# Patient Record
Sex: FEMALE | Race: WHITE | NOT HISPANIC OR LATINO | Employment: UNEMPLOYED | URBAN - NONMETROPOLITAN AREA
[De-identification: names, ages, dates, MRNs, and addresses within clinical notes are randomized per-mention and may not be internally consistent; named-entity substitution may affect disease eponyms.]

---

## 2019-03-15 ENCOUNTER — TRANSCRIBE ORDERS (OUTPATIENT)
Dept: MRI IMAGING | Facility: HOSPITAL | Age: 19
End: 2019-03-15

## 2019-03-15 DIAGNOSIS — M79.671 RIGHT FOOT PAIN: Primary | ICD-10-CM

## 2019-03-21 ENCOUNTER — HOSPITAL ENCOUNTER (OUTPATIENT)
Dept: MRI IMAGING | Facility: HOSPITAL | Age: 19
Discharge: HOME | End: 2019-03-21
Admitting: NURSE PRACTITIONER

## 2019-03-21 DIAGNOSIS — M79.671 RIGHT FOOT PAIN: ICD-10-CM

## 2019-03-21 PROCEDURE — 73718 MRI LOWER EXTREMITY W/O DYE: CPT

## 2020-10-15 ENCOUNTER — OFFICE VISIT (OUTPATIENT)
Dept: GASTROENTEROLOGY | Facility: CLINIC | Age: 20
End: 2020-10-15

## 2020-10-15 VITALS
SYSTOLIC BLOOD PRESSURE: 127 MMHG | HEART RATE: 74 BPM | BODY MASS INDEX: 29.57 KG/M2 | HEIGHT: 64 IN | WEIGHT: 173.2 LBS | DIASTOLIC BLOOD PRESSURE: 88 MMHG

## 2020-10-15 DIAGNOSIS — R19.4 CHANGE IN BOWEL HABITS: Primary | ICD-10-CM

## 2020-10-15 DIAGNOSIS — R10.11 RIGHT UPPER QUADRANT ABDOMINAL PAIN: ICD-10-CM

## 2020-10-15 PROCEDURE — 99204 OFFICE O/P NEW MOD 45 MIN: CPT | Performed by: INTERNAL MEDICINE

## 2020-10-15 RX ORDER — SODIUM, POTASSIUM,MAG SULFATES 17.5-3.13G
1 SOLUTION, RECONSTITUTED, ORAL ORAL EVERY 12 HOURS
Qty: 1 BOTTLE | Refills: 0 | Status: SHIPPED | OUTPATIENT
Start: 2020-10-15 | End: 2020-10-22 | Stop reason: HOSPADM

## 2020-10-15 RX ORDER — DEXTROSE AND SODIUM CHLORIDE 5; .45 G/100ML; G/100ML
30 INJECTION, SOLUTION INTRAVENOUS CONTINUOUS PRN
Status: CANCELLED | OUTPATIENT
Start: 2020-10-22

## 2020-10-15 NOTE — H&P (VIEW-ONLY)
[facility], [facility]      Chief Complaint:   Chief Complaint   Patient presents with   • Diarrhea       Subjective     HPI:   Patient with recent development of severe diarrhea.  Patient states is becoming increasingly life-changing.  Patient has a family history of irritable bowel syndrome and her mother but no other family history of GI problems.  Patient also with right upper quadrant abdominal pain which she describes has occurred for the past 3 months.  Patient states is only infrequently happens but radiates up into her shoulders.  Patient is not taking any medications that would cause diarrhea.  Patient denies any blood in stool or mucus in stool.    Plan; we will schedule patient for EGD and colonoscopy to evaluate also do ultrasound the abdomen we will do lab work including CMP CBC amylase and lipase will have patient follow-up following these procedures    Past Medical History:   History reviewed. No pertinent past medical history.    Past Surgical History:  Past Surgical History:   Procedure Laterality Date   • WISDOM TOOTH EXTRACTION  2018       Family History:  Family History   Problem Relation Age of Onset   • Irritable bowel syndrome Mother        Social History:       Medications:   Prior to Admission medications    Medication Sig Start Date End Date Taking? Authorizing Provider   Ascorbic Acid (VITAMIN C PO) Take  by mouth.   Yes ProviderJeri MD   Ferrous Sulfate (IRON PO) Take  by mouth.   Yes ProviderJeri MD   Levonorgestrel-Ethinyl Estrad (LARISSIA PO) Take  by mouth.   Yes ProviderJeri MD   sodium-potassium-magnesium sulfates (SUPREP) 17.5-3.13-1.6 GM/177ML solution oral solution Take 1 bottle by mouth Every 12 (Twelve) Hours. 10/15/20   Devon Santoro MD       Allergies:  Patient has no known allergies.    ROS:    Review of Systems   Constitutional: Negative for activity change, appetite change, chills, diaphoresis, fatigue, fever and unexpected  "weight change.   HENT: Negative for sore throat and trouble swallowing.    Respiratory: Negative for shortness of breath.    Gastrointestinal: Positive for abdominal pain and diarrhea. Negative for abdominal distention, anal bleeding, blood in stool, constipation, nausea, rectal pain and vomiting.   Endocrine: Negative for polydipsia, polyphagia and polyuria.   Genitourinary: Negative for difficulty urinating.   Musculoskeletal: Negative for arthralgias.   Skin: Negative for pallor.   Allergic/Immunologic: Negative for food allergies.   Neurological: Negative for weakness and light-headedness.   Psychiatric/Behavioral: Negative for behavioral problems.     Objective     Blood pressure 127/88, pulse 74, height 162.6 cm (64\"), weight 78.6 kg (173 lb 3.2 oz), not currently breastfeeding.    Physical Exam  Constitutional:       General: She is not in acute distress.     Appearance: She is well-developed. She is not diaphoretic.   HENT:      Head: Normocephalic and atraumatic.   Cardiovascular:      Rate and Rhythm: Normal rate and regular rhythm.      Heart sounds: Normal heart sounds. No murmur. No friction rub. No gallop.    Pulmonary:      Effort: No respiratory distress.      Breath sounds: Normal breath sounds. No wheezing or rales.   Chest:      Chest wall: No tenderness.   Abdominal:      General: Bowel sounds are normal. There is no distension.      Palpations: Abdomen is soft. There is no mass.      Tenderness: There is no abdominal tenderness. There is no guarding or rebound.      Hernia: No hernia is present.   Musculoskeletal: Normal range of motion.   Skin:     General: Skin is warm and dry.      Coloration: Skin is not pale.      Findings: No erythema or rash.   Neurological:      Mental Status: She is alert and oriented to person, place, and time.   Psychiatric:         Behavior: Behavior normal.         Thought Content: Thought content normal.         Judgment: Judgment normal.          Assessment/Plan "   Diagnoses and all orders for this visit:    1. Change in bowel habits (Primary)  -     Case Request; Standing  -     dextrose 5 % and sodium chloride 0.45 % infusion  -     Case Request  -     Comprehensive Metabolic Panel; Future  -     CBC & Differential; Future  -     Amylase; Future  -     Lipase; Future    2. Right upper quadrant abdominal pain  -     Case Request; Standing  -     dextrose 5 % and sodium chloride 0.45 % infusion  -     Case Request  -     Comprehensive Metabolic Panel; Future  -     CBC & Differential; Future  -     Amylase; Future  -     Lipase; Future    Other orders  -     Follow Anesthesia Guidelines / Standing Orders; Future  -     Obtain Informed Consent; Future  -     Implement Anesthesia Orders Day of Procedure; Standing  -     Obtain Informed Consent; Standing  -     POC Glucose Once; Standing  -     Pregnancy, Urine -; Standing  -     Insert Peripheral IV; Standing  -     sodium-potassium-magnesium sulfates (SUPREP) 17.5-3.13-1.6 GM/177ML solution oral solution; Take 1 bottle by mouth Every 12 (Twelve) Hours.  Dispense: 1 bottle; Refill: 0        ESOPHAGOGASTRODUODENOSCOPY (N/A), COLONOSCOPY (N/A)     Diagnosis Plan   1. Change in bowel habits  Case Request    dextrose 5 % and sodium chloride 0.45 % infusion    Case Request    Comprehensive Metabolic Panel    CBC & Differential    Amylase    Lipase   2. Right upper quadrant abdominal pain  Case Request    dextrose 5 % and sodium chloride 0.45 % infusion    Case Request    Comprehensive Metabolic Panel    CBC & Differential    Amylase    Lipase       Anticipated Surgical Procedure:  Orders Placed This Encounter   Procedures   • Comprehensive Metabolic Panel     Standing Status:   Future   • Amylase     Standing Status:   Future   • Lipase     Standing Status:   Future   • Follow Anesthesia Guidelines / Standing Orders     Standing Status:   Future   • Obtain Informed Consent     Standing Status:   Future     Order Specific Question:    Informed Consent Given For     Answer:   egd and colonoscopy   • CBC & Differential     Standing Status:   Future     Order Specific Question:   Manual Differential     Answer:   No       The risks, benefits, and alternatives of this procedure have been discussed with the patient or the responsible party- the patient understands and agrees to proceed.

## 2020-10-19 ENCOUNTER — LAB (OUTPATIENT)
Dept: LAB | Facility: HOSPITAL | Age: 20
End: 2020-10-19

## 2020-10-19 DIAGNOSIS — R19.4 CHANGE IN BOWEL HABITS: ICD-10-CM

## 2020-10-19 DIAGNOSIS — Z01.818 PREOP TESTING: Primary | ICD-10-CM

## 2020-10-19 DIAGNOSIS — R10.11 RIGHT UPPER QUADRANT ABDOMINAL PAIN: ICD-10-CM

## 2020-10-19 LAB
ALBUMIN SERPL-MCNC: 4.8 G/DL (ref 3.5–5.2)
ALBUMIN/GLOB SERPL: 1.7 G/DL
ALP SERPL-CCNC: 71 U/L (ref 39–117)
ALT SERPL W P-5'-P-CCNC: 21 U/L (ref 1–33)
AMYLASE SERPL-CCNC: 66 U/L (ref 28–100)
ANION GAP SERPL CALCULATED.3IONS-SCNC: 11 MMOL/L (ref 5–15)
AST SERPL-CCNC: 20 U/L (ref 1–32)
BASOPHILS # BLD AUTO: 0.02 10*3/MM3 (ref 0–0.2)
BASOPHILS NFR BLD AUTO: 0.4 % (ref 0–1.5)
BILIRUB SERPL-MCNC: 0.2 MG/DL (ref 0–1.2)
BUN SERPL-MCNC: 17 MG/DL (ref 6–20)
BUN/CREAT SERPL: 21 (ref 7–25)
CALCIUM SPEC-SCNC: 9.9 MG/DL (ref 8.6–10.5)
CHLORIDE SERPL-SCNC: 104 MMOL/L (ref 98–107)
CO2 SERPL-SCNC: 25 MMOL/L (ref 22–29)
CREAT SERPL-MCNC: 0.81 MG/DL (ref 0.57–1)
DEPRECATED RDW RBC AUTO: 42.8 FL (ref 37–54)
EOSINOPHIL # BLD AUTO: 0.07 10*3/MM3 (ref 0–0.4)
EOSINOPHIL NFR BLD AUTO: 1.4 % (ref 0.3–6.2)
ERYTHROCYTE [DISTWIDTH] IN BLOOD BY AUTOMATED COUNT: 14.1 % (ref 12.3–15.4)
GFR SERPL CREATININE-BSD FRML MDRD: 88 ML/MIN/1.73
GLOBULIN UR ELPH-MCNC: 2.9 GM/DL
GLUCOSE SERPL-MCNC: 108 MG/DL (ref 65–99)
HCT VFR BLD AUTO: 41.9 % (ref 34–46.6)
HGB BLD-MCNC: 13.6 G/DL (ref 12–15.9)
IMM GRANULOCYTES # BLD AUTO: 0.02 10*3/MM3 (ref 0–0.05)
IMM GRANULOCYTES NFR BLD AUTO: 0.4 % (ref 0–0.5)
LIPASE SERPL-CCNC: 34 U/L (ref 13–60)
LYMPHOCYTES # BLD AUTO: 1.88 10*3/MM3 (ref 0.7–3.1)
LYMPHOCYTES NFR BLD AUTO: 36.6 % (ref 19.6–45.3)
MCH RBC QN AUTO: 27.3 PG (ref 26.6–33)
MCHC RBC AUTO-ENTMCNC: 32.5 G/DL (ref 31.5–35.7)
MCV RBC AUTO: 84 FL (ref 79–97)
MONOCYTES # BLD AUTO: 0.29 10*3/MM3 (ref 0.1–0.9)
MONOCYTES NFR BLD AUTO: 5.7 % (ref 5–12)
NEUTROPHILS NFR BLD AUTO: 2.85 10*3/MM3 (ref 1.7–7)
NEUTROPHILS NFR BLD AUTO: 55.5 % (ref 42.7–76)
NRBC BLD AUTO-RTO: 0 /100 WBC (ref 0–0.2)
PLATELET # BLD AUTO: 259 10*3/MM3 (ref 140–450)
PMV BLD AUTO: 10.3 FL (ref 6–12)
POTASSIUM SERPL-SCNC: 4.4 MMOL/L (ref 3.5–5.2)
PROT SERPL-MCNC: 7.7 G/DL (ref 6–8.5)
RBC # BLD AUTO: 4.99 10*6/MM3 (ref 3.77–5.28)
SODIUM SERPL-SCNC: 140 MMOL/L (ref 136–145)
WBC # BLD AUTO: 5.13 10*3/MM3 (ref 3.4–10.8)

## 2020-10-19 PROCEDURE — 36415 COLL VENOUS BLD VENIPUNCTURE: CPT

## 2020-10-19 PROCEDURE — 85025 COMPLETE CBC W/AUTO DIFF WBC: CPT

## 2020-10-19 PROCEDURE — U0003 INFECTIOUS AGENT DETECTION BY NUCLEIC ACID (DNA OR RNA); SEVERE ACUTE RESPIRATORY SYNDROME CORONAVIRUS 2 (SARS-COV-2) (CORONAVIRUS DISEASE [COVID-19]), AMPLIFIED PROBE TECHNIQUE, MAKING USE OF HIGH THROUGHPUT TECHNOLOGIES AS DESCRIBED BY CMS-2020-01-R: HCPCS

## 2020-10-19 PROCEDURE — 83690 ASSAY OF LIPASE: CPT

## 2020-10-19 PROCEDURE — 82150 ASSAY OF AMYLASE: CPT

## 2020-10-19 PROCEDURE — C9803 HOPD COVID-19 SPEC COLLECT: HCPCS

## 2020-10-19 PROCEDURE — 80053 COMPREHEN METABOLIC PANEL: CPT

## 2020-10-20 LAB
COVID LABCORP PRIORITY: NORMAL
SARS-COV-2 RNA RESP QL NAA+PROBE: NOT DETECTED

## 2020-10-22 ENCOUNTER — HOSPITAL ENCOUNTER (OUTPATIENT)
Facility: HOSPITAL | Age: 20
Setting detail: HOSPITAL OUTPATIENT SURGERY
Discharge: HOME | End: 2020-10-22
Attending: INTERNAL MEDICINE | Admitting: INTERNAL MEDICINE

## 2020-10-22 ENCOUNTER — ANESTHESIA (OUTPATIENT)
Dept: GASTROENTEROLOGY | Facility: HOSPITAL | Age: 20
End: 2020-10-22

## 2020-10-22 ENCOUNTER — ANESTHESIA EVENT (OUTPATIENT)
Dept: GASTROENTEROLOGY | Facility: HOSPITAL | Age: 20
End: 2020-10-22

## 2020-10-22 VITALS
WEIGHT: 172.13 LBS | TEMPERATURE: 97.1 F | HEART RATE: 88 BPM | OXYGEN SATURATION: 95 % | DIASTOLIC BLOOD PRESSURE: 74 MMHG | RESPIRATION RATE: 18 BRPM | SYSTOLIC BLOOD PRESSURE: 106 MMHG | BODY MASS INDEX: 29.39 KG/M2 | HEIGHT: 64 IN

## 2020-10-22 DIAGNOSIS — R19.4 CHANGE IN BOWEL HABITS: ICD-10-CM

## 2020-10-22 DIAGNOSIS — R10.11 RIGHT UPPER QUADRANT ABDOMINAL PAIN: ICD-10-CM

## 2020-10-22 LAB — B-HCG UR QL: NEGATIVE

## 2020-10-22 PROCEDURE — 88305 TISSUE EXAM BY PATHOLOGIST: CPT

## 2020-10-22 PROCEDURE — 25010000002 PROPOFOL 10 MG/ML EMULSION: Performed by: NURSE ANESTHETIST, CERTIFIED REGISTERED

## 2020-10-22 PROCEDURE — 81025 URINE PREGNANCY TEST: CPT | Performed by: INTERNAL MEDICINE

## 2020-10-22 PROCEDURE — 88342 IMHCHEM/IMCYTCHM 1ST ANTB: CPT

## 2020-10-22 PROCEDURE — 43239 EGD BIOPSY SINGLE/MULTIPLE: CPT | Performed by: INTERNAL MEDICINE

## 2020-10-22 PROCEDURE — 45380 COLONOSCOPY AND BIOPSY: CPT | Performed by: INTERNAL MEDICINE

## 2020-10-22 RX ORDER — PROPOFOL 10 MG/ML
VIAL (ML) INTRAVENOUS AS NEEDED
Status: DISCONTINUED | OUTPATIENT
Start: 2020-10-22 | End: 2020-10-22 | Stop reason: SURG

## 2020-10-22 RX ORDER — DEXTROSE AND SODIUM CHLORIDE 5; .45 G/100ML; G/100ML
30 INJECTION, SOLUTION INTRAVENOUS CONTINUOUS PRN
Status: DISCONTINUED | OUTPATIENT
Start: 2020-10-22 | End: 2020-10-22 | Stop reason: HOSPADM

## 2020-10-22 RX ORDER — LIDOCAINE HYDROCHLORIDE 20 MG/ML
INJECTION, SOLUTION INTRAVENOUS AS NEEDED
Status: DISCONTINUED | OUTPATIENT
Start: 2020-10-22 | End: 2020-10-22 | Stop reason: SURG

## 2020-10-22 RX ADMIN — PROPOFOL 100 MG: 10 INJECTION, EMULSION INTRAVENOUS at 14:07

## 2020-10-22 RX ADMIN — PROPOFOL 50 MG: 10 INJECTION, EMULSION INTRAVENOUS at 14:15

## 2020-10-22 RX ADMIN — PROPOFOL 50 MG: 10 INJECTION, EMULSION INTRAVENOUS at 14:17

## 2020-10-22 RX ADMIN — LIDOCAINE HYDROCHLORIDE 50 MG: 20 INJECTION, SOLUTION INTRAVENOUS at 14:08

## 2020-10-22 RX ADMIN — PROPOFOL 50 MG: 10 INJECTION, EMULSION INTRAVENOUS at 14:19

## 2020-10-22 RX ADMIN — PROPOFOL 50 MG: 10 INJECTION, EMULSION INTRAVENOUS at 14:13

## 2020-10-22 RX ADMIN — DEXTROSE AND SODIUM CHLORIDE 30 ML/HR: 5; 450 INJECTION, SOLUTION INTRAVENOUS at 13:15

## 2020-10-22 RX ADMIN — PROPOFOL 50 MG: 10 INJECTION, EMULSION INTRAVENOUS at 14:21

## 2020-10-22 RX ADMIN — PROPOFOL 50 MG: 10 INJECTION, EMULSION INTRAVENOUS at 14:11

## 2020-10-22 RX ADMIN — PROPOFOL 50 MG: 10 INJECTION, EMULSION INTRAVENOUS at 14:09

## 2020-10-22 NOTE — ANESTHESIA POSTPROCEDURE EVALUATION
Patient: Oneida Patel    Procedure Summary     Date: 10/22/20 Room / Location: [facility] ENDOSCOPY 1 / [facility] ENDOSCOPY    Anesthesia Start: 1400 Anesthesia Stop: 1423    Procedures:       ESOPHAGOGASTRODUODENOSCOPY (N/A )      COLONOSCOPY (N/A ) Diagnosis:       Change in bowel habits      Right upper quadrant abdominal pain      (Change in bowel habits [R19.4])      (Right upper quadrant abdominal pain [R10.11])    Surgeon: Devon Santoro MD Provider: Joey Almendarez CRNA    Anesthesia Type: MAC ASA Status: 1          Anesthesia Type: MAC    Vitals  No vitals data found for the desired time range.          Post Anesthesia Care and Evaluation    Patient location during evaluation: bedside  Patient participation: complete - patient participated  Level of consciousness: sleepy but conscious  Pain score: 0  Pain management: adequate  Airway patency: patent  Anesthetic complications: No anesthetic complications  PONV Status: none  Cardiovascular status: acceptable  Respiratory status: acceptable  Hydration status: acceptable    Comments: ---------------------------               10/22/20                      1308         ---------------------------   BP:          122/88         Pulse:         57           Resp:          16           Temp:   97.4 °F (36.3 °C)   SpO2:          99%         ---------------------------

## 2020-10-22 NOTE — ANESTHESIA PREPROCEDURE EVALUATION
Anesthesia Evaluation     Patient summary reviewed and Nursing notes reviewed   NPO Solid Status: > 8 hours  NPO Liquid Status: > 2 hours           Airway   Mallampati: II  No difficulty expected  Dental - normal exam     Pulmonary - negative pulmonary ROS and normal exam   Cardiovascular - negative cardio ROS    Rhythm: regular  Rate: normal        Neuro/Psych- negative ROS  GI/Hepatic/Renal/Endo - negative ROS     Musculoskeletal (-) negative ROS    Abdominal    Substance History - negative use     OB/GYN negative ob/gyn ROS         Other                        Anesthesia Plan    ASA 1     MAC     intravenous induction     Anesthetic plan, all risks, benefits, and alternatives have been provided, discussed and informed consent has been obtained with: patient.    Plan discussed with CRNA.

## 2020-10-27 LAB
LAB AP CASE REPORT: NORMAL
PATH REPORT.FINAL DX SPEC: NORMAL

## 2020-10-29 ENCOUNTER — OFFICE VISIT (OUTPATIENT)
Dept: GASTROENTEROLOGY | Facility: CLINIC | Age: 20
End: 2020-10-29

## 2020-10-29 VITALS
HEIGHT: 64 IN | WEIGHT: 171 LBS | SYSTOLIC BLOOD PRESSURE: 130 MMHG | BODY MASS INDEX: 29.19 KG/M2 | HEART RATE: 84 BPM | DIASTOLIC BLOOD PRESSURE: 93 MMHG

## 2020-10-29 DIAGNOSIS — R11.2 INTRACTABLE VOMITING WITH NAUSEA, UNSPECIFIED VOMITING TYPE: Primary | ICD-10-CM

## 2020-10-29 DIAGNOSIS — R19.4 CHANGE IN BOWEL HABITS: ICD-10-CM

## 2020-10-29 PROCEDURE — 99214 OFFICE O/P EST MOD 30 MIN: CPT | Performed by: INTERNAL MEDICINE

## 2020-10-29 RX ORDER — DEXLANSOPRAZOLE 60 MG/1
60 CAPSULE, DELAYED RELEASE ORAL DAILY
Qty: 30 CAPSULE | Refills: 5 | Status: SHIPPED | OUTPATIENT
Start: 2020-10-29 | End: 2020-10-30 | Stop reason: SDUPTHER

## 2020-10-29 NOTE — PROGRESS NOTES
[facility]      Chief Complaint:   Chief Complaint   Patient presents with   • Follow-up     after endo       Subjective     HPI:   Patient with continued diarrhea.  Patient has probable irritable bowel syndrome EGD and colonoscopy did not show any cause for this diarrhea.  Patient has not gone for her ultrasound of the abdomen although she has some right upper quadrant abdominal pain.  Patient also states severe reflux and episodes of vomiting when she lays down EGD did not show any evidence of esophagitis or gastritis.  We will schedule patient for a gastric emptying study also place patient on proton pump inhibitor to relieve her reflux.    Plan; we will have patient follow-up in 1 month with ultrasound of the abdomen and gastric emptying study.  Will consider possibly doing GI distress panel and alpha gal also give medication for diarrhea at that time.    Past Medical History:   History reviewed. No pertinent past medical history.    Past Surgical History:  Past Surgical History:   Procedure Laterality Date   • COLONOSCOPY N/A 10/22/2020    Procedure: COLONOSCOPY;  Surgeon: Devon Santoro MD;  Location: [facility] ENDOSCOPY;  Service: Gastroenterology;  Laterality: N/A;   • ENDOSCOPY N/A 10/22/2020    Procedure: ESOPHAGOGASTRODUODENOSCOPY;  Surgeon: Devon Santoro MD;  Location: [facility] ENDOSCOPY;  Service: Gastroenterology;  Laterality: N/A;   • WISDOM TOOTH EXTRACTION  2018       Family History:  Family History   Problem Relation Age of Onset   • Irritable bowel syndrome Mother        Social History:       Medications:   Prior to Admission medications    Medication Sig Start Date End Date Taking? Authorizing Provider   Ascorbic Acid (VITAMIN C PO) Take  by mouth.    ProviderJeri MD   dexlansoprazole (Dexilant) 60 MG capsule Take 1 capsule by mouth Daily. 10/29/20   Devon Santoro MD   Ferrous Sulfate (IRON PO) Take  by mouth.    ProviderJeri MD  "  Levonorgestrel-Ethinyl Estrad (LARISSIA PO) Take  by mouth.    Provider, Jeri, MD       Allergies:  Patient has no known allergies.    ROS:    Review of Systems   Constitutional: Negative for activity change, appetite change, chills, diaphoresis, fatigue, fever and unexpected weight change.   HENT: Negative for sore throat and trouble swallowing.    Respiratory: Negative for shortness of breath.    Gastrointestinal: Positive for abdominal pain and diarrhea. Negative for abdominal distention, anal bleeding, blood in stool, constipation, nausea, rectal pain and vomiting.   Endocrine: Negative for polydipsia, polyphagia and polyuria.   Genitourinary: Negative for difficulty urinating.   Musculoskeletal: Negative for arthralgias.   Skin: Negative for pallor.   Allergic/Immunologic: Negative for food allergies.   Neurological: Negative for weakness and light-headedness.   Psychiatric/Behavioral: Negative for behavioral problems.     Objective     Blood pressure 130/93, pulse 84, height 162.6 cm (64\"), weight 77.6 kg (171 lb), last menstrual period 10/10/2020, not currently breastfeeding.    Physical Exam  Constitutional:       General: She is not in acute distress.     Appearance: She is well-developed. She is not diaphoretic.   HENT:      Head: Normocephalic and atraumatic.   Cardiovascular:      Rate and Rhythm: Normal rate and regular rhythm.      Heart sounds: Normal heart sounds. No murmur. No friction rub. No gallop.    Pulmonary:      Effort: No respiratory distress.      Breath sounds: Normal breath sounds. No wheezing or rales.   Chest:      Chest wall: No tenderness.   Abdominal:      General: Bowel sounds are normal. There is no distension.      Palpations: Abdomen is soft. There is no mass.      Tenderness: There is no abdominal tenderness. There is no guarding or rebound.      Hernia: No hernia is present.   Musculoskeletal: Normal range of motion.   Skin:     General: Skin is warm and dry.      " Coloration: Skin is not pale.      Findings: No erythema or rash.   Neurological:      Mental Status: She is alert and oriented to person, place, and time.   Psychiatric:         Behavior: Behavior normal.         Thought Content: Thought content normal.         Judgment: Judgment normal.          Assessment/Plan   Diagnoses and all orders for this visit:    1. Intractable vomiting with nausea, unspecified vomiting type (Primary)  -     NM Gastric Emptying; Future    2. Change in bowel habits    Other orders  -     dexlansoprazole (Dexilant) 60 MG capsule; Take 1 capsule by mouth Daily.  Dispense: 30 capsule; Refill: 5        * Surgery not found *     Diagnosis Plan   1. Intractable vomiting with nausea, unspecified vomiting type  NM Gastric Emptying   2. Change in bowel habits         Anticipated Surgical Procedure:  Orders Placed This Encounter   Procedures   • NM Gastric Emptying     Standing Status:   Future     Standing Expiration Date:   10/29/2021     Order Specific Question:   Patient Pregnant     Answer:   No     Order Specific Question:   Is the patient breastfeeding?     Answer:   No       The risks, benefits, and alternatives of this procedure have been discussed with the patient or the responsible party- the patient understands and agrees to proceed.

## 2020-10-29 NOTE — PATIENT INSTRUCTIONS
"BMI for Adults  What is BMI?  Body mass index (BMI) is a number that is calculated from a person's weight and height. BMI can help estimate how much of a person's weight is composed of fat. BMI does not measure body fat directly. Rather, it is an alternative to procedures that directly measure body fat, which can be difficult and expensive.  BMI can help identify people who may be at higher risk for certain medical problems.  What are BMI measurements used for?  BMI is used as a screening tool to identify possible weight problems. It helps determine whether a person is obese, overweight, a healthy weight, or underweight.  BMI is useful for:  · Identifying a weight problem that may be related to a medical condition or may increase the risk for medical problems.  · Promoting changes, such as changes in diet and exercise, to help reach a healthy weight. BMI screening can be repeated to see if these changes are working.  How is BMI calculated?  BMI involves measuring your weight in relation to your height. Both height and weight are measured, and the BMI is calculated from those numbers. This can be done either in English ([place] or metric measurements. Note that charts and online BMI calculators are available to help you find your BMI quickly and easily without having to do these calculations yourself.  To calculate your BMI in English ([place] measurements:    1. Measure your weight in pounds (lb).  2. Multiply the number of pounds by 703.  ? For example, for a person who weighs 180 lb, multiply that number by 703, which equals 126,540.  3. Measure your height in inches. Then multiply that number by itself to get a measurement called \"inches squared.\"  ? For example, for a person who is 70 inches tall, the \"inches squared\" measurement is 70 inches x 70 inches, which equals 4,900 inches squared.  4. Divide the total from step 2 (number of lb x 703) by the total from step 3 (inches squared): 126,540 ÷ 4,900 = 25.8. This is " "your BMI.  To calculate your BMI in metric measurements:  1. Measure your weight in kilograms (kg).  2. Measure your height in meters (m). Then multiply that number by itself to get a measurement called \"meters squared.\"  ? For example, for a person who is 1.75 m tall, the \"meters squared\" measurement is 1.75 m x 1.75 m, which is equal to 3.1 meters squared.  3. Divide the number of kilograms (your weight) by the meters squared number. In this example: 70 ÷ 3.1 = 22.6. This is your BMI.  What do the results mean?  BMI charts are used to identify whether you are underweight, normal weight, overweight, or obese. The following guidelines will be used:  · Underweight: BMI less than 18.5.  · Normal weight: BMI between 18.5 and 24.9.  · Overweight: BMI between 25 and 29.9.  · Obese: BMI of 30 or above.  Keep these notes in mind:  · Weight includes both fat and muscle, so someone with a muscular build, such as an athlete, may have a BMI that is higher than 24.9. In cases like these, BMI is not an accurate measure of body fat.  · To determine if excess body fat is the cause of a BMI of 25 or higher, further assessments may need to be done by a health care provider.  · BMI is usually interpreted in the same way for men and women.  Where to find more information  For more information about BMI, including tools to quickly calculate your BMI, go to these websites:  · Centers for Disease Control and Prevention: www.cdc.gov  · American Heart Association: www.heart.org  · National Heart, Lung, and [employer] [place]: www.nhlbi.nih.gov  Summary  · Body mass index (BMI) is a number that is calculated from a person's weight and height.  · BMI may help estimate how much of a person's weight is composed of fat. BMI can help identify those who may be at higher risk for certain medical problems.  · BMI can be measured using English measurements or metric measurements.  · BMI charts are used to identify whether you are underweight, normal " weight, overweight, or obese.  This information is not intended to replace advice given to you by your health care provider. Make sure you discuss any questions you have with your health care provider.  Document Released: 08/29/2005 Document Revised: 09/09/2020 Document Reviewed: 07/17/2020  Elsevier Patient Education © 2020 [employer]

## 2020-10-30 RX ORDER — DEXLANSOPRAZOLE 60 MG/1
60 CAPSULE, DELAYED RELEASE ORAL DAILY
Qty: 30 CAPSULE | Refills: 5 | Status: SHIPPED | OUTPATIENT
Start: 2020-10-30

## 2020-11-09 ENCOUNTER — HOSPITAL ENCOUNTER (OUTPATIENT)
Dept: NUCLEAR MEDICINE | Facility: HOSPITAL | Age: 20
Discharge: HOME | End: 2020-11-09

## 2020-11-09 DIAGNOSIS — R11.2 INTRACTABLE VOMITING WITH NAUSEA, UNSPECIFIED VOMITING TYPE: ICD-10-CM

## 2020-11-09 PROCEDURE — 78264 GASTRIC EMPTYING IMG STUDY: CPT

## 2020-11-09 PROCEDURE — 0 TECHNETIUM SULFUR COLLOID: Performed by: INTERNAL MEDICINE

## 2020-11-09 PROCEDURE — A9541 TC99M SULFUR COLLOID: HCPCS | Performed by: INTERNAL MEDICINE

## 2020-11-09 RX ADMIN — TECHNETIUM TC 99M SULFUR COLLOID 1 DOSE: KIT at 07:10

## 2020-11-12 ENCOUNTER — TELEPHONE (OUTPATIENT)
Dept: GASTROENTEROLOGY | Facility: CLINIC | Age: 20
End: 2020-11-12

## 2020-11-12 NOTE — TELEPHONE ENCOUNTER
This medication is not covered by the patient's benefit. There is no PA available. The plan stopped covering this medication a year ago.  There are covered alternatives and  is aware. Dr. Santoro states he wants her on Dexilant. Patient must pay 100%.        This medication may be excluded from the patient's benefit. For more information, please reach out to [facility] directly at [phone].    I did call this number and was informed the above.

## 2020-11-19 ENCOUNTER — APPOINTMENT (OUTPATIENT)
Dept: ULTRASOUND IMAGING | Facility: HOSPITAL | Age: 20
End: 2020-11-19

## 2020-11-19 ENCOUNTER — HOSPITAL ENCOUNTER (OUTPATIENT)
Facility: HOSPITAL | Age: 20
Discharge: HOME | End: 2020-11-22
Attending: EMERGENCY MEDICINE | Admitting: SURGERY

## 2020-11-19 DIAGNOSIS — K80.20 GALLSTONES: Primary | ICD-10-CM

## 2020-11-19 DIAGNOSIS — N39.0 URINARY TRACT INFECTION IN FEMALE: ICD-10-CM

## 2020-11-19 DIAGNOSIS — R74.8 ELEVATED LIVER ENZYMES: ICD-10-CM

## 2020-11-19 DIAGNOSIS — K81.0 ACUTE CHOLECYSTITIS: ICD-10-CM

## 2020-11-19 PROBLEM — K85.10 BILIARY ACUTE PANCREATITIS WITHOUT NECROSIS OR INFECTION: Status: ACTIVE | Noted: 2020-11-19

## 2020-11-19 LAB
ALBUMIN SERPL-MCNC: 5.1 G/DL (ref 3.5–5.2)
ALBUMIN/GLOB SERPL: 1.8 G/DL
ALP SERPL-CCNC: 169 U/L (ref 39–117)
ALT SERPL W P-5'-P-CCNC: 925 U/L (ref 1–33)
AMYLASE SERPL-CCNC: 503 U/L (ref 28–100)
ANION GAP SERPL CALCULATED.3IONS-SCNC: 12 MMOL/L (ref 5–15)
AST SERPL-CCNC: 903 U/L (ref 1–32)
B-HCG UR QL: NEGATIVE
BACTERIA UR QL AUTO: ABNORMAL /HPF
BASOPHILS # BLD AUTO: 0.02 10*3/MM3 (ref 0–0.2)
BASOPHILS NFR BLD AUTO: 0.4 % (ref 0–1.5)
BILIRUB SERPL-MCNC: 3.6 MG/DL (ref 0–1.2)
BILIRUB UR QL STRIP: ABNORMAL
BUN SERPL-MCNC: 12 MG/DL (ref 6–20)
BUN/CREAT SERPL: 14.5 (ref 7–25)
CALCIUM SPEC-SCNC: 10.1 MG/DL (ref 8.6–10.5)
CHLORIDE SERPL-SCNC: 100 MMOL/L (ref 98–107)
CLARITY UR: ABNORMAL
CO2 SERPL-SCNC: 27 MMOL/L (ref 22–29)
COLOR UR: ABNORMAL
CREAT SERPL-MCNC: 0.83 MG/DL (ref 0.57–1)
DEPRECATED RDW RBC AUTO: 39.3 FL (ref 37–54)
EOSINOPHIL # BLD AUTO: 0.06 10*3/MM3 (ref 0–0.4)
EOSINOPHIL NFR BLD AUTO: 1.3 % (ref 0.3–6.2)
ERYTHROCYTE [DISTWIDTH] IN BLOOD BY AUTOMATED COUNT: 13.1 % (ref 12.3–15.4)
FLUAV RNA RESP QL NAA+PROBE: NOT DETECTED
FLUBV RNA RESP QL NAA+PROBE: NOT DETECTED
GFR SERPL CREATININE-BSD FRML MDRD: 86 ML/MIN/1.73
GLOBULIN UR ELPH-MCNC: 2.8 GM/DL
GLUCOSE SERPL-MCNC: 119 MG/DL (ref 65–99)
GLUCOSE UR STRIP-MCNC: NEGATIVE MG/DL
HCT VFR BLD AUTO: 42.3 % (ref 34–46.6)
HGB BLD-MCNC: 14.1 G/DL (ref 12–15.9)
HGB UR QL STRIP.AUTO: NEGATIVE
HOLD SPECIMEN: NORMAL
HYALINE CASTS UR QL AUTO: ABNORMAL /LPF
IMM GRANULOCYTES # BLD AUTO: 0.01 10*3/MM3 (ref 0–0.05)
IMM GRANULOCYTES NFR BLD AUTO: 0.2 % (ref 0–0.5)
KETONES UR QL STRIP: ABNORMAL
LEUKOCYTE ESTERASE UR QL STRIP.AUTO: ABNORMAL
LIPASE SERPL-CCNC: 2833 U/L (ref 13–60)
LYMPHOCYTES # BLD AUTO: 0.75 10*3/MM3 (ref 0.7–3.1)
LYMPHOCYTES NFR BLD AUTO: 16.2 % (ref 19.6–45.3)
MCH RBC QN AUTO: 27.9 PG (ref 26.6–33)
MCHC RBC AUTO-ENTMCNC: 33.3 G/DL (ref 31.5–35.7)
MCV RBC AUTO: 83.8 FL (ref 79–97)
MONOCYTES # BLD AUTO: 0.24 10*3/MM3 (ref 0.1–0.9)
MONOCYTES NFR BLD AUTO: 5.2 % (ref 5–12)
NEUTROPHILS NFR BLD AUTO: 3.54 10*3/MM3 (ref 1.7–7)
NEUTROPHILS NFR BLD AUTO: 76.7 % (ref 42.7–76)
NITRITE UR QL STRIP: POSITIVE
NRBC BLD AUTO-RTO: 0 /100 WBC (ref 0–0.2)
PH UR STRIP.AUTO: 7.5 [PH] (ref 5–9)
PLATELET # BLD AUTO: 242 10*3/MM3 (ref 140–450)
PMV BLD AUTO: 10.3 FL (ref 6–12)
POTASSIUM SERPL-SCNC: 3.9 MMOL/L (ref 3.5–5.2)
PROT SERPL-MCNC: 7.9 G/DL (ref 6–8.5)
PROT UR QL STRIP: ABNORMAL
RBC # BLD AUTO: 5.05 10*6/MM3 (ref 3.77–5.28)
RBC # UR: ABNORMAL /HPF
REF LAB TEST METHOD: ABNORMAL
SARS-COV-2 RNA RESP QL NAA+PROBE: NOT DETECTED
SODIUM SERPL-SCNC: 139 MMOL/L (ref 136–145)
SP GR UR STRIP: 1.03 (ref 1–1.03)
SQUAMOUS #/AREA URNS HPF: ABNORMAL /HPF
UROBILINOGEN UR QL STRIP: ABNORMAL
WBC # BLD AUTO: 4.62 10*3/MM3 (ref 3.4–10.8)
WBC UR QL AUTO: ABNORMAL /HPF
WHOLE BLOOD HOLD SPECIMEN: NORMAL

## 2020-11-19 PROCEDURE — 81025 URINE PREGNANCY TEST: CPT | Performed by: NURSE PRACTITIONER

## 2020-11-19 PROCEDURE — 96376 TX/PRO/DX INJ SAME DRUG ADON: CPT

## 2020-11-19 PROCEDURE — 81001 URINALYSIS AUTO W/SCOPE: CPT | Performed by: NURSE PRACTITIONER

## 2020-11-19 PROCEDURE — 87636 SARSCOV2 & INF A&B AMP PRB: CPT | Performed by: EMERGENCY MEDICINE

## 2020-11-19 PROCEDURE — 80053 COMPREHEN METABOLIC PANEL: CPT | Performed by: NURSE PRACTITIONER

## 2020-11-19 PROCEDURE — G0378 HOSPITAL OBSERVATION PER HR: HCPCS

## 2020-11-19 PROCEDURE — 82150 ASSAY OF AMYLASE: CPT | Performed by: NURSE PRACTITIONER

## 2020-11-19 PROCEDURE — 25010000002 CEFTRIAXONE: Performed by: NURSE PRACTITIONER

## 2020-11-19 PROCEDURE — 96375 TX/PRO/DX INJ NEW DRUG ADDON: CPT

## 2020-11-19 PROCEDURE — 85025 COMPLETE CBC W/AUTO DIFF WBC: CPT | Performed by: NURSE PRACTITIONER

## 2020-11-19 PROCEDURE — 83690 ASSAY OF LIPASE: CPT | Performed by: NURSE PRACTITIONER

## 2020-11-19 PROCEDURE — 96361 HYDRATE IV INFUSION ADD-ON: CPT

## 2020-11-19 PROCEDURE — 25010000002 KETOROLAC TROMETHAMINE PER 15 MG: Performed by: NURSE PRACTITIONER

## 2020-11-19 PROCEDURE — 25010000002 MORPHINE PER 10 MG: Performed by: INTERNAL MEDICINE

## 2020-11-19 PROCEDURE — 76705 ECHO EXAM OF ABDOMEN: CPT

## 2020-11-19 PROCEDURE — 25010000002 ONDANSETRON PER 1 MG: Performed by: INTERNAL MEDICINE

## 2020-11-19 PROCEDURE — 25010000002 ONDANSETRON PER 1 MG: Performed by: NURSE PRACTITIONER

## 2020-11-19 PROCEDURE — 99284 EMERGENCY DEPT VISIT MOD MDM: CPT

## 2020-11-19 RX ORDER — SODIUM CHLORIDE 0.9 % (FLUSH) 0.9 %
10 SYRINGE (ML) INJECTION AS NEEDED
Status: DISCONTINUED | OUTPATIENT
Start: 2020-11-19 | End: 2020-11-22 | Stop reason: HOSPADM

## 2020-11-19 RX ORDER — NALOXONE HCL 0.4 MG/ML
0.4 VIAL (ML) INJECTION
Status: DISCONTINUED | OUTPATIENT
Start: 2020-11-19 | End: 2020-11-21 | Stop reason: SDUPTHER

## 2020-11-19 RX ORDER — ACETAMINOPHEN 325 MG/1
650 TABLET ORAL EVERY 4 HOURS PRN
Status: DISCONTINUED | OUTPATIENT
Start: 2020-11-19 | End: 2020-11-22 | Stop reason: HOSPADM

## 2020-11-19 RX ORDER — KETOROLAC TROMETHAMINE 30 MG/ML
30 INJECTION, SOLUTION INTRAMUSCULAR; INTRAVENOUS ONCE
Status: COMPLETED | OUTPATIENT
Start: 2020-11-19 | End: 2020-11-19

## 2020-11-19 RX ORDER — ONDANSETRON 2 MG/ML
4 INJECTION INTRAMUSCULAR; INTRAVENOUS ONCE
Status: COMPLETED | OUTPATIENT
Start: 2020-11-19 | End: 2020-11-19

## 2020-11-19 RX ORDER — MORPHINE SULFATE 2 MG/ML
2 INJECTION, SOLUTION INTRAMUSCULAR; INTRAVENOUS EVERY 4 HOURS PRN
Status: DISCONTINUED | OUTPATIENT
Start: 2020-11-19 | End: 2020-11-21 | Stop reason: SDUPTHER

## 2020-11-19 RX ORDER — SODIUM CHLORIDE 0.9 % (FLUSH) 0.9 %
10 SYRINGE (ML) INJECTION EVERY 12 HOURS SCHEDULED
Status: DISCONTINUED | OUTPATIENT
Start: 2020-11-19 | End: 2020-11-22 | Stop reason: HOSPADM

## 2020-11-19 RX ORDER — ONDANSETRON 2 MG/ML
4 INJECTION INTRAMUSCULAR; INTRAVENOUS EVERY 6 HOURS PRN
Status: DISCONTINUED | OUTPATIENT
Start: 2020-11-19 | End: 2020-11-21 | Stop reason: SDUPTHER

## 2020-11-19 RX ORDER — SODIUM CHLORIDE, SODIUM LACTATE, POTASSIUM CHLORIDE, CALCIUM CHLORIDE 600; 310; 30; 20 MG/100ML; MG/100ML; MG/100ML; MG/100ML
125 INJECTION, SOLUTION INTRAVENOUS CONTINUOUS
Status: DISCONTINUED | OUTPATIENT
Start: 2020-11-19 | End: 2020-11-21

## 2020-11-19 RX ADMIN — SODIUM CHLORIDE, POTASSIUM CHLORIDE, SODIUM LACTATE AND CALCIUM CHLORIDE 125 ML/HR: 600; 310; 30; 20 INJECTION, SOLUTION INTRAVENOUS at 19:54

## 2020-11-19 RX ADMIN — ONDANSETRON 4 MG: 2 INJECTION INTRAMUSCULAR; INTRAVENOUS at 19:54

## 2020-11-19 RX ADMIN — MORPHINE SULFATE 2 MG: 2 INJECTION, SOLUTION INTRAMUSCULAR; INTRAVENOUS at 19:54

## 2020-11-19 RX ADMIN — KETOROLAC TROMETHAMINE 30 MG: 30 INJECTION, SOLUTION INTRAMUSCULAR at 15:09

## 2020-11-19 RX ADMIN — Medication 10 ML: at 22:00

## 2020-11-19 RX ADMIN — CEFTRIAXONE 1 G: 1 INJECTION, POWDER, FOR SOLUTION INTRAMUSCULAR; INTRAVENOUS at 16:05

## 2020-11-19 RX ADMIN — SODIUM CHLORIDE 1000 ML: 900 INJECTION, SOLUTION INTRAVENOUS at 15:08

## 2020-11-19 RX ADMIN — ONDANSETRON HYDROCHLORIDE 4 MG: 2 INJECTION, SOLUTION INTRAMUSCULAR; INTRAVENOUS at 15:08

## 2020-11-19 NOTE — ED NOTES
Pt presents to ED with c/o abdominal pain and vomiting. Pt has a hx of IBS. Pt states she spoke with  and was instructed to come to the ED, for possible gallbladder issues.      Afia Duarte  11/19/20 [id number]

## 2020-11-19 NOTE — ED PROVIDER NOTES
"Subjective   Patient presents to the ER with complaints of possible gallbladder attack.  Patient states started last night with RUQ pain that has been constant. She states she has had this pain for the past month but it would last about 2 hours and resolve and occur once a week. She states this time is is not going away. She reports she has had nausea and vomiting. Pain radiates into her back and shoulders. Medications include Naproxen and Tums without relief. She states she has been seeing GI for possible IBS and has recently had EGD/colonoscopy and gastric emptying study completed.           Review of Systems   Constitutional: Negative.    Respiratory: Negative.    Cardiovascular: Negative.    Gastrointestinal: Positive for abdominal pain (RUQ), nausea and vomiting. Negative for diarrhea.   Genitourinary: Negative.    Musculoskeletal: Positive for back pain.   Skin: Negative.    Neurological: Negative.    Psychiatric/Behavioral: Negative.        History reviewed. No pertinent past medical history.    No Known Allergies    Past Surgical History:   Procedure Laterality Date   • COLONOSCOPY N/A 10/22/2020    Procedure: COLONOSCOPY;  Surgeon: Devon Santoro MD;  Location: [facility] ENDOSCOPY;  Service: Gastroenterology;  Laterality: N/A;   • ENDOSCOPY N/A 10/22/2020    Procedure: ESOPHAGOGASTRODUODENOSCOPY;  Surgeon: Devon Santoro MD;  Location: [facility] ENDOSCOPY;  Service: Gastroenterology;  Laterality: N/A;   • WISDOM TOOTH EXTRACTION  2018       Family History   Problem Relation Age of Onset   • Irritable bowel syndrome Mother        Social History     Socioeconomic History   • Marital status:      Spouse name: Not on file   • Number of children: Not on file   • Years of education: Not on file   • Highest education level: Not on file           Objective    /86 (BP Location: Left arm, Patient Position: Sitting)   Pulse 62   Temp 97.2 °F (36.2 °C) (Temporal)   Resp 18   Ht 162.6 cm (64\")   Wt 77.6 " kg (171 lb)   SpO2 100%   BMI 29.35 kg/m²     Physical Exam  Vitals signs and nursing note reviewed.   Constitutional:       General: She is not in acute distress.     Appearance: She is well-developed. She is not ill-appearing.   HENT:      Head: Normocephalic and atraumatic.   Neck:      Musculoskeletal: Normal range of motion and neck supple.   Cardiovascular:      Rate and Rhythm: Normal rate and regular rhythm.      Heart sounds: Normal heart sounds. No murmur.   Pulmonary:      Effort: Pulmonary effort is normal. No respiratory distress.      Breath sounds: Normal breath sounds. No wheezing.   Abdominal:      General: Bowel sounds are normal. There is no distension.      Palpations: Abdomen is soft.      Tenderness: There is abdominal tenderness in the right upper quadrant.   Musculoskeletal: Normal range of motion.   Skin:     General: Skin is warm and dry.      Capillary Refill: Capillary refill takes less than 2 seconds.   Neurological:      Mental Status: She is alert and oriented to person, place, and time.      Coordination: Coordination normal.   Psychiatric:         Behavior: Behavior normal.         Thought Content: Thought content normal.         Judgment: Judgment normal.         Procedures  Results for orders placed or performed during the hospital encounter of 11/19/20   Urinalysis With Microscopic If Indicated (No Culture) - Urine, Clean Catch    Specimen: Urine, Clean Catch   Result Value Ref Range    Color, UA Orange (A) Yellow, Straw, Dark Yellow, Maryanne    Appearance, UA Cloudy (A) Clear    pH, UA 7.5 5.0 - 9.0    Specific [place], UA 1.026 1.003 - 1.030    Glucose, UA Negative Negative    Ketones, UA Trace (A) Negative    Bilirubin, UA Large (3+) (A) Negative    Blood, UA Negative Negative    Protein, UA Trace (A) Negative    Leuk Esterase, UA Small (1+) (A) Negative    Nitrite, UA Positive (A) Negative    Urobilinogen, UA 1.0 E.U./dL 0.2 - 1.0 E.U./dL   Comprehensive Metabolic Panel     Specimen: Blood   Result Value Ref Range    Glucose 119 (H) 65 - 99 mg/dL    BUN 12 6 - 20 mg/dL    Creatinine 0.83 0.57 - 1.00 mg/dL    Sodium 139 136 - 145 mmol/L    Potassium 3.9 3.5 - 5.2 mmol/L    Chloride 100 98 - 107 mmol/L    CO2 27.0 22.0 - 29.0 mmol/L    Calcium 10.1 8.6 - 10.5 mg/dL    Total Protein 7.9 6.0 - 8.5 g/dL    Albumin 5.10 3.50 - 5.20 g/dL    ALT (SGPT) 925 (H) 1 - 33 U/L    AST (SGOT) 903 (H) 1 - 32 U/L    Alkaline Phosphatase 169 (H) 39 - 117 U/L    Total Bilirubin 3.6 (H) 0.0 - 1.2 mg/dL    eGFR Non African Amer 86 >60 mL/min/1.73    Globulin 2.8 gm/dL    A/G Ratio 1.8 g/dL    BUN/Creatinine Ratio 14.5 7.0 - 25.0    Anion Gap 12.0 5.0 - 15.0 mmol/L   Lipase    Specimen: Blood   Result Value Ref Range    Lipase 2,833 (H) 13 - 60 U/L   Pregnancy, Urine - Urine, Clean Catch    Specimen: Urine, Clean Catch   Result Value Ref Range    HCG, Urine QL Negative Negative   CBC Auto Differential    Specimen: Blood   Result Value Ref Range    WBC 4.62 3.40 - 10.80 10*3/mm3    RBC 5.05 3.77 - 5.28 10*6/mm3    Hemoglobin 14.1 12.0 - 15.9 g/dL    Hematocrit 42.3 34.0 - 46.6 %    MCV 83.8 79.0 - 97.0 fL    MCH 27.9 26.6 - 33.0 pg    MCHC 33.3 31.5 - 35.7 g/dL    RDW 13.1 12.3 - 15.4 %    RDW-SD 39.3 37.0 - 54.0 fl    MPV 10.3 6.0 - 12.0 fL    Platelets 242 140 - 450 10*3/mm3    Neutrophil % 76.7 (H) 42.7 - 76.0 %    Lymphocyte % 16.2 (L) 19.6 - 45.3 %    Monocyte % 5.2 5.0 - 12.0 %    Eosinophil % 1.3 0.3 - 6.2 %    Basophil % 0.4 0.0 - 1.5 %    Immature Grans % 0.2 0.0 - 0.5 %    Neutrophils, Absolute 3.54 1.70 - 7.00 10*3/mm3    Lymphocytes, Absolute 0.75 0.70 - 3.10 10*3/mm3    Monocytes, Absolute 0.24 0.10 - 0.90 10*3/mm3    Eosinophils, Absolute 0.06 0.00 - 0.40 10*3/mm3    Basophils, Absolute 0.02 0.00 - 0.20 10*3/mm3    Immature Grans, Absolute 0.01 0.00 - 0.05 10*3/mm3    nRBC 0.0 0.0 - 0.2 /100 WBC   Urinalysis, Microscopic Only - Urine, Clean Catch    Specimen: Urine, Clean Catch   Result Value  Ref Range    RBC, UA None Seen None Seen /HPF    WBC, UA 3-5 None Seen, 0-2, 3-5 /HPF    Bacteria, UA 2+ (A) None Seen /HPF    Squamous Epithelial Cells, UA 21-30 (A) None Seen, 0-2 /HPF    Hyaline Casts, UA 13-20 None Seen /LPF    Methodology Manual Light Microscopy    Amylase    Specimen: Blood   Result Value Ref Range    Amylase 503 (H) 28 - 100 U/L   Light Blue Top   Result Value Ref Range    Extra Tube hold for add-on    Gold Top - SST   Result Value Ref Range    Extra Tube Hold for add-ons.      Nm Gastric Emptying    Result Date: 11/9/2020  Narrative: Nuclear medicine gastric emptying study History: Functional dyspepsia. Nausea and vomiting. Correlation: None Following the oral administration of 1.1 millicuries of technetium 99m sulfur colloid in oatmeal, a gastric emptying study was performed. FINDINGS: Normal gastric emptying time of 55 minutes. No gastroesophageal reflux.     Impression: Conclusion: Normal gastric emptying time of 55 minutes. [id number] Electronically signed by:  Keny Villeda MD  11/9/2020 3:10 PM [employer] Workstation: [id number]    Us Gallbladder    Result Date: 11/19/2020  Narrative: PROCEDURE: US GALLBLADDER INDICATION:  Right upper quadrant pain COMPARISON:  None TECHNIQUE:  Ultrasound, limited, right upper quadrant FINDINGS: Liver:    size: Limited evaluation, grossly negative    echotexture Within normal limits    no focal mass or intrahepatic biliary ductal dilatation Biliary:   Gall bladder:  Echogenic, shadowing gallstones                         Wall is thickened to 0.41 cm. No pericholecystic fluid    Common bile duct:  Normal caliber   at 0.19 cm Pancreas (visualized portions):    Normal size and echotexture for age   No focal mass or ductal dilatation  Kidney, right (limited):    size:  Normal, 9.8 x 3.8 x 5.0   echotexture:  Normal   No nephrolithiasis, solid mass, or collecting system dilation Vascular (visualized portions):    Aorta:  Normal caliber   IVC:  Normal caliber, no  intraluminal defect(s)     Impression: Cholelithiasis with gallbladder wall thickening. Cholecystis not excluded. Electronically signed by:  Tahmina Bright MD  11/19/2020 4:03 PM CST Workstation: [id number]             ED Course  ED Course as of Nov 19 1656   Thu Nov 19, 2020   1629 Patient discussed with MD. Will admit to hospitalist at this time.     [SH]   1632 Spoke with Dr. Shin. Agrees with admission at this time.     [SH]   1638 Discussed admission with patient.     [SH]      ED Course User Index  [SH] Jessie Sosa, MANI                                           [facility]    Final diagnoses:   Gallstones   Elevated liver enzymes   Urinary tract infection in female            Jessie Sosa, MANI  11/25/20 3752

## 2020-11-19 NOTE — H&P
[facility] Medicine Services  INPATIENT HISTORY AND PHYSICAL       Patient Care Team:  Liv Snyder as PCP - General (Nurse Practitioner)    Chief complaint   Chief Complaint   Patient presents with   • Abdominal Pain   • Nausea   • Vomiting       Subjective     Patient is a 20 y.o. female with past medical history of suspected irritable bowel syndrome currently being worked up by gastroenterologist Dr. Santoro who presented with complaints of worsening intermittent right upper quadrant pain of 2 months duration.    Patient has been having right upper quadrant pain that has been worsening over the past 2 months.  The pain is intermittent in nature and episodes typically last for a few hours before resolving.  Patient developed similar episodes of right upper quadrant pain yesterday that radiated to her scapula and upper shoulder. The pain persisted this time and she had multiple episodes of vomiting and nausea.  She also noted dark orange urine but denies dysuria or hematuria.  She denied fevers or chills.  On account of her symptoms she presented to the emergency room today.  CMP showed elevated total bilirubin and transaminases.  She also had elevated amylase and lipase.  Right upper quadrant ultrasound showed gallbladder thickening and gallstones. CBD was normal in diameter. At the time of my evaluation patient states that her pain is improved and is 6/10 intensity.    Review of Systems   Constitutional: Negative for activity change, appetite change, chills, fatigue and fever.   HENT: Negative for congestion, rhinorrhea, sore throat and trouble swallowing.    Respiratory: Negative for cough, chest tightness, shortness of breath and wheezing.    Cardiovascular: Negative for chest pain, palpitations and leg swelling.   Gastrointestinal: Positive for abdominal pain, nausea and vomiting. Negative for abdominal distention and diarrhea.   Genitourinary: Negative for  difficulty urinating, dysuria and hematuria.   Musculoskeletal: Negative for arthralgias, back pain and myalgias.   Skin: Negative for pallor and rash.   Neurological: Negative for dizziness, syncope, weakness, light-headedness and headaches.   Hematological: Negative for adenopathy. Does not bruise/bleed easily.   Psychiatric/Behavioral: Negative for agitation and confusion. The patient is not nervous/anxious.      History  History reviewed. No pertinent past medical history.  Past Surgical History:   Procedure Laterality Date   • COLONOSCOPY N/A 10/22/2020    Procedure: COLONOSCOPY;  Surgeon: Devon Santoro MD;  Location: [facility] ENDOSCOPY;  Service: Gastroenterology;  Laterality: N/A;   • ENDOSCOPY N/A 10/22/2020    Procedure: ESOPHAGOGASTRODUODENOSCOPY;  Surgeon: Devon Santoro MD;  Location: [facility] ENDOSCOPY;  Service: Gastroenterology;  Laterality: N/A;   • WISDOM TOOTH EXTRACTION  2018     Family History   Problem Relation Age of Onset   • Irritable bowel syndrome Mother    • Bipolar disorder Mother    • Irritable bowel syndrome Father      Social History     Tobacco Use   • Smoking status: Never Smoker   Substance Use Topics   • Alcohol use: Never     Frequency: Never   • Drug use: Not on file     Medications Prior to Admission   Medication Sig Dispense Refill Last Dose   • Ascorbic Acid (VITAMIN C PO) Take  by mouth.      • dexlansoprazole (Dexilant) 60 MG capsule Take 1 capsule by mouth Daily. 30 capsule 5    • Ferrous Sulfate (IRON PO) Take  by mouth.      • Levonorgestrel-Ethinyl Estrad (LARISSIA PO) Take  by mouth.        Allergies:  Patient has no known allergies.  Prior to Admission medications    Medication Sig Start Date End Date Taking? Authorizing Provider   Ascorbic Acid (VITAMIN C PO) Take  by mouth.    Provider, MD Jeri   dexlansoprazole (Dexilant) 60 MG capsule Take 1 capsule by mouth Daily. 10/30/20   Devon Santoro MD   Ferrous Sulfate (IRON PO) Take  by mouth.    Provider,  MD Jeri   Levonorgestrel-Ethinyl Estrad (LARISSIA PO) Take  by mouth.    Provider, MD Jeri       I have reviewed the patient's current medications    Objective        Vital Signs  Temp:  [97 °F (36.1 °C)-97.2 °F (36.2 °C)] 97 °F (36.1 °C)  Heart Rate:  [62-71] 66  Resp:  [18-20] 18  BP: (123-125)/(86-88) 125/88      Physical Exam  Constitutional:       General: She is not in acute distress.     Appearance: Normal appearance. She is not ill-appearing or diaphoretic.   HENT:      Head: Normocephalic and atraumatic.      Right Ear: External ear normal.      Left Ear: External ear normal.      Nose: No congestion or rhinorrhea.      Mouth/Throat:      Mouth: Mucous membranes are moist.      Pharynx: No oropharyngeal exudate or posterior oropharyngeal erythema.   Eyes:      General: No scleral icterus.     Extraocular Movements: Extraocular movements intact.      Conjunctiva/sclera: Conjunctivae normal.   Neck:      Musculoskeletal: Neck supple. No neck rigidity or muscular tenderness.   Cardiovascular:      Rate and Rhythm: Normal rate and regular rhythm.      Heart sounds: Normal heart sounds. No murmur.   Pulmonary:      Effort: Pulmonary effort is normal. No respiratory distress.      Breath sounds: Normal breath sounds. No wheezing, rhonchi or rales.   Abdominal:      General: Abdomen is flat. There is no distension.      Palpations: Abdomen is soft.      Tenderness: There is abdominal tenderness. There is no guarding.      Comments: Tenderness in right upper quadrant and epigastric region.   Musculoskeletal:         General: No swelling, tenderness or deformity.      Right lower leg: No edema.      Left lower leg: No edema.   Lymphadenopathy:      Cervical: No cervical adenopathy.   Skin:     General: Skin is warm and dry.   Neurological:      General: No focal deficit present.      Mental Status: She is alert and oriented to person, place, and time.      Cranial Nerves: No cranial nerve deficit.       Motor: No weakness.   Psychiatric:         Mood and Affect: Mood normal.         Behavior: Behavior normal.         Thought Content: Thought content normal.         Results Review:     Results from last 7 days   Lab Units 11/19/20  1507   SODIUM mmol/L 139   POTASSIUM mmol/L 3.9   CHLORIDE mmol/L 100   CO2 mmol/L 27.0   BUN mg/dL 12   CREATININE mg/dL 0.83   GLUCOSE mg/dL 119*   CALCIUM mg/dL 10.1   BILIRUBIN mg/dL 3.6*   ALK PHOS U/L 169*   ALT (SGPT) U/L 925*   AST (SGOT) U/L 903*              Results from last 7 days   Lab Units 11/19/20  1507   WBC 10*3/mm3 4.62   HEMOGLOBIN g/dL 14.1   HEMATOCRIT % 42.3   PLATELETS 10*3/mm3 242           Imaging Results (Last 7 Days)     Procedure Component Value Units Date/Time    US Gallbladder [[id number]] Collected: 11/19/20 1512     Updated: 11/19/20 1604    Narrative:      PROCEDURE: US GALLBLADDER    INDICATION:  Right upper quadrant pain     COMPARISON:  None    TECHNIQUE:  Ultrasound, limited, right upper quadrant    FINDINGS:    Liver:      size: Limited evaluation, grossly negative      echotexture Within normal limits      no focal mass or intrahepatic biliary ductal dilatation     Biliary:    Gall bladder:  Echogenic, shadowing gallstones                          Wall is thickened to 0.41 cm. No  pericholecystic fluid      Common bile duct:  Normal caliber   at 0.19 cm     Pancreas (visualized portions):      Normal size and echotexture for age    No focal mass or ductal dilatation      Kidney, right (limited):      size:  Normal, 9.8 x 3.8 x 5.0    echotexture:  Normal    No nephrolithiasis, solid mass, or collecting system dilation    Vascular (visualized portions):      Aorta:  Normal caliber    IVC:  Normal caliber, no intraluminal defect(s)        Impression:      Cholelithiasis with gallbladder wall thickening.  Cholecystis not excluded.       Electronically signed by:  Tahmina Bright MD  11/19/2020 4:03 PM  CST Workstation: [id number]          Assessment /  Plan       Hospital Problem List:  Principal Problem:    Acute cholecystitis  Active Problems:    Gallstones    Biliary acute pancreatitis without necrosis or infection    Plan  -Patient has acute cholecystitis and laboratory findings consistent with gallstone pancreatitis  -We will place patient n.p.o.  -IV Ringer's lactate  -Pain control with IV morphine as needed  -We will start antibiotic coverage with IV ceftriaxone and Flagyl  -Monitor liver function test and CBC for leukocytosis  -Monitor vital signs closely for fever  -General surgery consultation has been sent.  -Please consult gastroenterologist Dr. Rowley in the morning for evaluation and possible ERCP  -DVT prophylaxis with SCDs  -CODE STATUS is full code     I discussed the patient's findings and my recommendations with patient.    Heaven Shin MD  11/19/20  19:19 CST        Part of this note may be an electronic transcription/translation of spoken language to printed text using the Dragon Dictation System.

## 2020-11-20 ENCOUNTER — APPOINTMENT (OUTPATIENT)
Dept: MRI IMAGING | Facility: HOSPITAL | Age: 20
End: 2020-11-20

## 2020-11-20 LAB
ALBUMIN SERPL-MCNC: 4.4 G/DL (ref 3.5–5.2)
ALBUMIN/GLOB SERPL: 1.7 G/DL
ALP SERPL-CCNC: 157 U/L (ref 39–117)
ALT SERPL W P-5'-P-CCNC: 629 U/L (ref 1–33)
ANION GAP SERPL CALCULATED.3IONS-SCNC: 14 MMOL/L (ref 5–15)
AST SERPL-CCNC: 378 U/L (ref 1–32)
BASOPHILS # BLD AUTO: 0.02 10*3/MM3 (ref 0–0.2)
BASOPHILS NFR BLD AUTO: 0.4 % (ref 0–1.5)
BILIRUB SERPL-MCNC: 1 MG/DL (ref 0–1.2)
BUN SERPL-MCNC: 10 MG/DL (ref 6–20)
BUN/CREAT SERPL: 14.7 (ref 7–25)
CALCIUM SPEC-SCNC: 9.5 MG/DL (ref 8.6–10.5)
CHLORIDE SERPL-SCNC: 103 MMOL/L (ref 98–107)
CO2 SERPL-SCNC: 22 MMOL/L (ref 22–29)
CREAT SERPL-MCNC: 0.68 MG/DL (ref 0.57–1)
DEPRECATED RDW RBC AUTO: 38.8 FL (ref 37–54)
EOSINOPHIL # BLD AUTO: 0.05 10*3/MM3 (ref 0–0.4)
EOSINOPHIL NFR BLD AUTO: 1 % (ref 0.3–6.2)
ERYTHROCYTE [DISTWIDTH] IN BLOOD BY AUTOMATED COUNT: 12.8 % (ref 12.3–15.4)
GFR SERPL CREATININE-BSD FRML MDRD: 108 ML/MIN/1.73
GLOBULIN UR ELPH-MCNC: 2.6 GM/DL
GLUCOSE SERPL-MCNC: 97 MG/DL (ref 65–99)
HCT VFR BLD AUTO: 38.2 % (ref 34–46.6)
HGB BLD-MCNC: 12.3 G/DL (ref 12–15.9)
IMM GRANULOCYTES # BLD AUTO: 0.01 10*3/MM3 (ref 0–0.05)
IMM GRANULOCYTES NFR BLD AUTO: 0.2 % (ref 0–0.5)
LIPASE SERPL-CCNC: 719 U/L (ref 13–60)
LYMPHOCYTES # BLD AUTO: 1.16 10*3/MM3 (ref 0.7–3.1)
LYMPHOCYTES NFR BLD AUTO: 24.2 % (ref 19.6–45.3)
MCH RBC QN AUTO: 27.2 PG (ref 26.6–33)
MCHC RBC AUTO-ENTMCNC: 32.2 G/DL (ref 31.5–35.7)
MCV RBC AUTO: 84.3 FL (ref 79–97)
MONOCYTES # BLD AUTO: 0.22 10*3/MM3 (ref 0.1–0.9)
MONOCYTES NFR BLD AUTO: 4.6 % (ref 5–12)
NEUTROPHILS NFR BLD AUTO: 3.33 10*3/MM3 (ref 1.7–7)
NEUTROPHILS NFR BLD AUTO: 69.6 % (ref 42.7–76)
NRBC BLD AUTO-RTO: 0 /100 WBC (ref 0–0.2)
PLATELET # BLD AUTO: 210 10*3/MM3 (ref 140–450)
PMV BLD AUTO: 10.7 FL (ref 6–12)
POTASSIUM SERPL-SCNC: 3.7 MMOL/L (ref 3.5–5.2)
PROT SERPL-MCNC: 7 G/DL (ref 6–8.5)
RBC # BLD AUTO: 4.53 10*6/MM3 (ref 3.77–5.28)
SODIUM SERPL-SCNC: 139 MMOL/L (ref 136–145)
WBC # BLD AUTO: 4.79 10*3/MM3 (ref 3.4–10.8)

## 2020-11-20 PROCEDURE — 83690 ASSAY OF LIPASE: CPT | Performed by: INTERNAL MEDICINE

## 2020-11-20 PROCEDURE — 99204 OFFICE O/P NEW MOD 45 MIN: CPT | Performed by: INTERNAL MEDICINE

## 2020-11-20 PROCEDURE — G0378 HOSPITAL OBSERVATION PER HR: HCPCS

## 2020-11-20 PROCEDURE — 80053 COMPREHEN METABOLIC PANEL: CPT | Performed by: INTERNAL MEDICINE

## 2020-11-20 PROCEDURE — 99204 OFFICE O/P NEW MOD 45 MIN: CPT | Performed by: SURGERY

## 2020-11-20 PROCEDURE — 25010000002 CEFTRIAXONE PER 250 MG: Performed by: INTERNAL MEDICINE

## 2020-11-20 PROCEDURE — 96367 TX/PROPH/DG ADDL SEQ IV INF: CPT

## 2020-11-20 PROCEDURE — 25010000002 ONDANSETRON PER 1 MG: Performed by: INTERNAL MEDICINE

## 2020-11-20 PROCEDURE — 96376 TX/PRO/DX INJ SAME DRUG ADON: CPT

## 2020-11-20 PROCEDURE — 74181 MRI ABDOMEN W/O CONTRAST: CPT

## 2020-11-20 PROCEDURE — 96365 THER/PROPH/DIAG IV INF INIT: CPT

## 2020-11-20 PROCEDURE — 96361 HYDRATE IV INFUSION ADD-ON: CPT

## 2020-11-20 PROCEDURE — 25010000002 MORPHINE PER 10 MG: Performed by: INTERNAL MEDICINE

## 2020-11-20 PROCEDURE — 85025 COMPLETE CBC W/AUTO DIFF WBC: CPT | Performed by: INTERNAL MEDICINE

## 2020-11-20 PROCEDURE — 96366 THER/PROPH/DIAG IV INF ADDON: CPT

## 2020-11-20 RX ADMIN — METRONIDAZOLE 500 MG: 500 INJECTION, SOLUTION INTRAVENOUS at 20:02

## 2020-11-20 RX ADMIN — METRONIDAZOLE 500 MG: 500 INJECTION, SOLUTION INTRAVENOUS at 06:04

## 2020-11-20 RX ADMIN — CEFTRIAXONE SODIUM 1 G: 1 INJECTION, POWDER, FOR SOLUTION INTRAMUSCULAR; INTRAVENOUS at 05:23

## 2020-11-20 RX ADMIN — SODIUM CHLORIDE, POTASSIUM CHLORIDE, SODIUM LACTATE AND CALCIUM CHLORIDE 125 ML/HR: 600; 310; 30; 20 INJECTION, SOLUTION INTRAVENOUS at 09:22

## 2020-11-20 RX ADMIN — METRONIDAZOLE 500 MG: 500 INJECTION, SOLUTION INTRAVENOUS at 10:57

## 2020-11-20 RX ADMIN — ONDANSETRON 4 MG: 2 INJECTION INTRAMUSCULAR; INTRAVENOUS at 20:05

## 2020-11-20 RX ADMIN — MORPHINE SULFATE 2 MG: 2 INJECTION, SOLUTION INTRAMUSCULAR; INTRAVENOUS at 20:05

## 2020-11-20 RX ADMIN — ONDANSETRON 4 MG: 2 INJECTION INTRAMUSCULAR; INTRAVENOUS at 12:58

## 2020-11-20 NOTE — PLAN OF CARE
Problem: Adult Inpatient Plan of Care  Goal: Plan of Care Review  Outcome: Ongoing, Progressing  Flowsheets (Taken 11/20/2020 0038)  Progress: no change  Plan of Care Reviewed With: patient  Outcome Summary: vss, new admisson, room air, NPO, pain and nausea controlled with IV meds, pt resting well, will cont to monitor

## 2020-11-20 NOTE — PLAN OF CARE
Goal Outcome Evaluation:  Plan of Care Reviewed With: patient  Progress: no change  Outcome Summary: pt. VSS. still NPO. possible surgery this weekend

## 2020-11-20 NOTE — CONSULTS
SUBJECTIVE:   11/20/2020    Name: Oneida Patel  DOD: 2000    REASON FOR CONSULT: Acute gallstone pancreatitis and elevated liver enzymes    Chief Complaint:     Chief Complaint   Patient presents with   • Abdominal Pain   • Nausea   • Vomiting       Subjective     Patient is 20 y.o. female with past medical history of wisdom tooth extraction, right upper quadrant pain, diarrhea, recent EGD and colonoscopy last month by Dr. Santoro was consistent with gastritis and internal hemorrhoids with path unremarkable for microscopic colitis presents with right upper quadrant abdominal pain, nausea and vomiting of 2 days duration.  Pain is intermittent, moderate to severe, radiates to back and shoulders, associated with nausea and vomiting and is worse with food intake.  She uses NSAIDs as needed.  Upon presentation to the emergency room she was noted to have elevation of the liver enzymes, amylase and lipase.  Liver enzymes have improved since admission.  She denied history of alcohol usage.  Right upper quadrant sonogram was consistent with cholelithiasis and gallbladder wall thickening.  MRCP was consistent with cholelithiasis and splenomegaly.  Unremarkable for choledocholithiasis.  Patient feels Better currently with improvement of abdominal pain.  No further nausea and emesis since admission.  Lipase has improved from 2833 to 719     ROS/HISTORY/ CURRENT MEDICATIONS/OBJECTIVE/VS/PE:   Review of Systems:   Review of Systems   Constitutional: Negative for chills, fatigue, fever and unexpected weight change.   HENT: Negative for congestion, ear discharge, hearing loss, nosebleeds and sore throat.    Eyes: Negative for pain, discharge and redness.   Respiratory: Negative for cough, chest tightness, shortness of breath and wheezing.    Cardiovascular: Negative for chest pain and palpitations.   Gastrointestinal: Positive for abdominal pain, diarrhea, nausea and vomiting. Negative for abdominal distention, blood  in stool and constipation.   Endocrine: Negative for cold intolerance, polydipsia, polyphagia and polyuria.   Genitourinary: Negative for dysuria, flank pain, frequency, hematuria and urgency.   Musculoskeletal: Negative for arthralgias, back pain, joint swelling and myalgias.   Skin: Negative for color change, pallor and rash.   Neurological: Negative for tremors, seizures, syncope, weakness and headaches.   Hematological: Negative for adenopathy. Does not bruise/bleed easily.   Psychiatric/Behavioral: Negative for behavioral problems, confusion, dysphoric mood, hallucinations and suicidal ideas. The patient is not nervous/anxious.        History:   History reviewed. No pertinent past medical history.  Past Surgical History:   Procedure Laterality Date   • COLONOSCOPY N/A 10/22/2020    Procedure: COLONOSCOPY;  Surgeon: Devon Santoro MD;  Location: [facility] ENDOSCOPY;  Service: Gastroenterology;  Laterality: N/A;   • ENDOSCOPY N/A 10/22/2020    Procedure: ESOPHAGOGASTRODUODENOSCOPY;  Surgeon: Devon Santoro MD;  Location: [facility] ENDOSCOPY;  Service: Gastroenterology;  Laterality: N/A;   • WISDOM TOOTH EXTRACTION  2018     Family History   Problem Relation Age of Onset   • Irritable bowel syndrome Mother    • Bipolar disorder Mother    • Irritable bowel syndrome Father      Social History     Tobacco Use   • Smoking status: Never Smoker   Substance Use Topics   • Alcohol use: Never     Frequency: Never   • Drug use: Not on file     Medications Prior to Admission   Medication Sig Dispense Refill Last Dose   • Ascorbic Acid (VITAMIN C PO) Take  by mouth.   More than a month at Unknown time   • dexlansoprazole (Dexilant) 60 MG capsule Take 1 capsule by mouth Daily. 30 capsule 5 Unknown at Unknown time   • Ferrous Sulfate (IRON PO) Take  by mouth.   More than a month at Unknown time   • Levonorgestrel-Ethinyl Estrad (LARISSIA PO) Take  by mouth.   More than a month at Unknown time     Allergies:  Patient has no known  allergies.    I have reviewed the patient's medical history, surgical history and family history in the available medical record system.     Current Medications:     Current Facility-Administered Medications   Medication Dose Route Frequency Provider Last Rate Last Admin   • acetaminophen (TYLENOL) tablet 650 mg  650 mg Oral Q4H PRN Heaven Shin MD       • [START ON 11/21/2020] cefTRIAXone (ROCEPHIN) 2 g/100 mL 0.9% NS IVPB (MBP)  2 g Intravenous Q24H Heaven Shin MD       • lactated ringers infusion  125 mL/hr Intravenous Continuous Heaven Shin  mL/hr at 11/20/20 1141 125 mL/hr at 11/20/20 1141   • metroNIDAZOLE (FLAGYL) 500 mg/100mL IVPB  500 mg Intravenous Q8H Heaven Shin MD   Stopped at 11/20/20 1259   • morphine injection 2 mg  2 mg Intravenous Q4H PRN Heaven Shin MD   2 mg at 11/19/20 1954    And   • naloxone (NARCAN) injection 0.4 mg  0.4 mg Intravenous Q5 Min PRN Heaven Shin MD       • ondansetron (ZOFRAN) injection 4 mg  4 mg Intravenous Q6H PRN Heaven Shin MD   4 mg at 11/20/20 1258   • sodium chloride 0.9 % flush 10 mL  10 mL Intravenous PRN Heaven Shin MD       • sodium chloride 0.9 % flush 10 mL  10 mL Intravenous Q12H Heaven Shin MD   10 mL at 11/19/20 2200   • sodium chloride 0.9 % flush 10 mL  10 mL Intravenous PRN Heaven Shin MD           Objective     Physical Exam:   Temp:  [97 °F (36.1 °C)-98.9 °F (37.2 °C)] 97.2 °F (36.2 °C)  Heart Rate:  [62-78] 66  Resp:  [18] 18  BP: (118-142)/(74-95) 118/76    Physical Exam:  General Appearance:    Alert, cooperative, in no acute distress   Head:    Normocephalic, without obvious abnormality, atraumatic   Eyes:            Lids and lashes normal, conjunctivae and sclerae normal, no   icterus, no pallor, corneas clear, PERRLA   Ears:    Ears appear intact with no abnormalities noted   Throat:   No oral lesions, no thrush, oral mucosa moist   Neck:   No adenopathy, supple,  trachea midline, no thyromegaly, no     carotid bruit, no JVD   Back:     No kyphosis present, no scoliosis present, no skin lesions,       erythema or scars, no tenderness to percussion or                   palpation,   range of motion normal   Lungs:     Clear to auscultation,respirations regular, even and                   unlabored    Heart:    Regular rhythm and normal rate, normal S1 and S2, no            murmur, no gallop, no rub, no click   Breast Exam:    Deferred   Abdomen:     Normal bowel sounds, no masses, no organomegaly, soft        nontender, nondistended, no guarding, no rebound                 tenderness   Genitalia:    Deferred   Extremities:   Moves all extremities well, no edema, no cyanosis, no              redness   Pulses:   Pulses palpable and equal bilaterally   Skin:   No bleeding, bruising or rash   Lymph nodes:   No palpable adenopathy   Neurologic:   Cranial nerves 2 - 12 grossly intact, sensation intact, DTR        present and equal bilaterally      Results Review:     Lab Results   Component Value Date    WBC 4.79 11/20/2020    WBC 4.62 11/19/2020    WBC 5.13 10/19/2020    HGB 12.3 11/20/2020    HGB 14.1 11/19/2020    HGB 13.6 10/19/2020    HCT 38.2 11/20/2020    HCT 42.3 11/19/2020    HCT 41.9 10/19/2020     11/20/2020     11/19/2020     10/19/2020     Results from last 7 days   Lab Units 11/20/20  0611 11/19/20  1507   ALK PHOS U/L 157* 169*   ALT (SGPT) U/L 629* 925*   AST (SGOT) U/L 378* 903*     Results from last 7 days   Lab Units 11/20/20  0611 11/19/20  1507   BILIRUBIN mg/dL 1.0 3.6*   ALK PHOS U/L 157* 169*     Lipase   Date Value Ref Range Status   11/20/2020 719 (H) 13 - 60 U/L Final   11/19/2020 2,833 (H) 13 - 60 U/L Final     No results found for: INR      Radiology Review:  Imaging Results (Last 72 Hours)     Procedure Component Value Units Date/Time    MRI abdomen wo contrast mrcp [[id number]] Collected: 11/20/20 0925     Updated: 11/20/20 1129     Narrative:      EXAM: MR CHOLANGIOPANCREATOGRAPHY (MRCP)    COMPARISONS: Ultrasound 11/19/2020.    INDICATION: Abdominal pain, biliary obstruction suspected (Ped  0-18y)  , K80.20 Calculus of gallbladder without cholecystitis without  obstruction R74.8 Abnormal levels of other serum enzymes N39.0  Urinary tract infection, site not specified: Gallstone  pancreatitis    TECHNIQUE: Multiplanar, multisequence MR sequences of the  epigastrium were obtained without the use of gadolinium  intravenous contrast. 3-D MIP images of the biliary system were  provided.    FINDINGS:   The liver is normal in size, signal and contour. 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 No focal  hepatic lesion. There is no intra or extrahepatic ductal  dilatation.    The common bile duct is of normal course and caliber without  evidence of filling defect or focal stenosis. The diameter of the  common bile duct measures 4-5 mm.     There are multiple dependent low signal stones seen within the  gallbladder lumen. The gallbladder wall is thickened to 4 to 5  mm. There is a small amount of pericholecystic fluid.    The pancreatic duct is normal in course and caliber. The  pancreatic parenchyma is of normal signal.    The kidneys are of normal signal without focal abnormality. There  is no dilatation of the proximal collecting system.    The spleen measures 14.0 cm in AP dimension and is otherwise  normal.    The remaining visualized upper abdominal contents are  unremarkable.     The imaged bone marrow, musculature, and lung bases are within  normal limits.      Impression:      Cholelithiasis with thickened gallbladder wall and trace amount  of pericholecystic fluid. In presence of right upper quadrant  pain, this can be seen with acute cholecystitis.    Question splenomegaly versus patient anatomic baseline. Correlate  with exam and laboratory values.    Electronically signed by:  Kaitlynn Murphy MD   11/20/2020  11:27 AM CST Workstation: [id number]    US Gallbladder [[id number]] Collected: 11/19/20 1512     Updated: 11/19/20 1604    Narrative:      PROCEDURE: US GALLBLADDER    INDICATION:  Right upper quadrant pain     COMPARISON:  None    TECHNIQUE:  Ultrasound, limited, right upper quadrant    FINDINGS:    Liver:      size: Limited evaluation, grossly negative      echotexture Within normal limits      no focal mass or intrahepatic biliary ductal dilatation     Biliary:    Gall bladder:  Echogenic, shadowing gallstones                          Wall is thickened to 0.41 cm. No  pericholecystic fluid      Common bile duct:  Normal caliber   at 0.19 cm     Pancreas (visualized portions):      Normal size and echotexture for age    No focal mass or ductal dilatation      Kidney, right (limited):      size:  Normal, 9.8 x 3.8 x 5.0    echotexture:  Normal    No nephrolithiasis, solid mass, or collecting system dilation    Vascular (visualized portions):      Aorta:  Normal caliber    IVC:  Normal caliber, no intraluminal defect(s)        Impression:      Cholelithiasis with gallbladder wall thickening.  Cholecystis not excluded.       Electronically signed by:  Tahmina Bright MD  11/19/2020 4:03 PM  CST Workstation: [id number]          I reviewed the patient's new clinical results.    I reviewed the patient's new imaging results and agree with the interpretation.     ASSESSMENT/PLAN:   ASSESSMENT: 1.  Acute gallstone pancreatitis, improving.  2.  Elevated liver enzymes, improving.  Likely due to gallstone passage.  3.  Splenomegaly, likely nonspecific.  4.  Diarrhea, likely irritable bowel syndrome.    PLAN: 1.  Follow LFTs and lipase closely.  2.  Cholecystectomy per surgery.  3.  Continue PPI, pain management and antiemetics.  The risks, benefits, and alternatives of this procedure have been discussed with the patient or the responsible party. The patient understands and agrees to proceed.         Emile  MD Claudia  11/20/20  15:21 CST

## 2020-11-20 NOTE — CONSULTS
Referring Provider:  Dr Shin      Patient Care Team:  Liv Snyder as PCP - General (Nurse Practitioner)      Subjective .  Gallstone pancreatitis    History of present illness:   20-year-old female presented to the emergency room with right upper quadrant pain that is cramping in nature and sometimes radiates to her back.  She had similar episodes like this for the past 2 months.  This pain recently been going on for nearly 2 days.  Associated with nausea and vomiting.  She has had similar episodes of this but did not seem to last as long and did not seem to be as severe over the past 2 months.  No history of pancreatitis no history of being jaundiced.  Has seen the GI service Dr. Santoro and been worked up for diarrhea which has had for now about 10 months.  He is done an EGD and a colonoscopy on her and felt that she had irritable bowel syndrome.  I also did a gastric emptying study which was normal recently.  The symptoms seem to be different from the current symptoms that brought her into the hospital however.  She denies have a history of constipation prior to a year ago.  No prior abdominal surgery.  She does not smoke.  She does not drink alcohol.  No prior history of pancreatitis.    Work-up here in the emergency room significant for a normal white count.  Dated LFTs with a total bilirubin initially of 3.6 and this morning is 1.0.  Alkaline phosphatase was 169 it is now 157 and transaminases were in the 900s and now they are 600s and 300s.  Originally lipase was 2800 and now over 700.  Amylase is over 500, ultrasound has been reviewed and agree that she does have gallstones.  Thickness of the gallbladder wall was 0.41 cm.  Common bile duct was 0.19 cm.    Review of Systems   Constitutional: Negative for appetite change, chills, fever and unexpected weight change.   HENT: Negative for hearing loss, nosebleeds and trouble swallowing.    Eyes: Negative for visual disturbance.   Respiratory: Negative  for apnea, cough, choking, chest tightness, shortness of breath, wheezing and stridor.    Cardiovascular: Negative for chest pain, palpitations and leg swelling.   Gastrointestinal: Positive for abdominal pain, diarrhea, nausea and vomiting. Negative for abdominal distention, blood in stool and constipation.   Endocrine: Negative for cold intolerance, heat intolerance, polydipsia, polyphagia and polyuria.   Genitourinary: Negative for difficulty urinating, dysuria, frequency, hematuria and urgency.   Musculoskeletal: Negative for arthralgias, back pain, myalgias and neck pain.   Skin: Negative for color change, pallor and rash.   Allergic/Immunologic: Negative for immunocompromised state.   Neurological: Negative for dizziness, seizures, syncope, light-headedness, numbness and headaches.   Hematological: Negative for adenopathy.   Psychiatric/Behavioral: Negative for suicidal ideas. The patient is not nervous/anxious.          History  History reviewed. No pertinent past medical history.,   Past Surgical History:   Procedure Laterality Date   • COLONOSCOPY N/A 10/22/2020    Procedure: COLONOSCOPY;  Surgeon: Devon Santoro MD;  Location: [facility] ENDOSCOPY;  Service: Gastroenterology;  Laterality: N/A;   • ENDOSCOPY N/A 10/22/2020    Procedure: ESOPHAGOGASTRODUODENOSCOPY;  Surgeon: Devon Santoro MD;  Location: [facility] ENDOSCOPY;  Service: Gastroenterology;  Laterality: N/A;   • WISDOM TOOTH EXTRACTION  2018   ,   Family History   Problem Relation Age of Onset   • Irritable bowel syndrome Mother    • Bipolar disorder Mother    • Irritable bowel syndrome Father    ,   Social History     Tobacco Use   • Smoking status: Never Smoker   Substance Use Topics   • Alcohol use: Never     Frequency: Never   • Drug use: Not on file   , Home Medications:  Prior to Admission medications    Medication Sig Start Date End Date Taking? Authorizing Provider   Ascorbic Acid (VITAMIN C PO) Take  by mouth.    Provider, MD Jeri    dexlansoprazole (Dexilant) 60 MG capsule Take 1 capsule by mouth Daily. 10/30/20   Devon Santoro MD   Ferrous Sulfate (IRON PO) Take  by mouth.    ProviderJeri MD   Levonorgestrel-Ethinyl Estrad (LARISSIA PO) Take  by mouth.    Provider, MD Jeri   , Scheduled Meds:  [START ON 11/21/2020] cefTRIAXone, 2 g, Intravenous, Q24H  metroNIDAZOLE, 500 mg, Intravenous, Q8H  sodium chloride, 10 mL, Intravenous, Q12H    , Continuous Infusions:  lactated ringers, 125 mL/hr, Last Rate: 125 mL/hr (11/20/20 1141)    , PRN Meds:  •  acetaminophen  •  Morphine **AND** naloxone  •  ondansetron  •  [COMPLETED] Insert peripheral IV **AND** sodium chloride  •  sodium chloride and Allergies:  No Known Allergies    Objective     Vital Signs   Temp:  [97 °F (36.1 °C)-98.9 °F (37.2 °C)] 97.2 °F (36.2 °C)  Heart Rate:  [62-78] 66  Resp:  [18-20] 18  BP: (118-142)/(74-95) 118/76    Physical Exam:     General Appearance:    Alert, cooperative, in no acute distress   Head:    Normocephalic, without obvious abnormality, atraumatic   Eyes:            Lids and lashes normal, conjunctivae and sclerae normal, no   icterus, no pallor, corneas clear   Ears:    Ears appear intact with no abnormalities noted   Throat:   No oral lesions, no thrush, oral mucosa moist   Neck:   No adenopathy, supple, trachea midline, no thyromegaly,   no JVD   Lungs:     Clear to auscultation,respirations regular, even and                  unlabored    Heart:    Regular rhythm and normal rate, normal S1 and S2, no            murmur, no gallop, no rub, no click   Chest Wall:    No abnormalities observed   Abdomen:     Normal bowel sounds, no masses, no organomegaly, soft        nontender, nondistended, no guarding, no rebound                tenderness   Extremities:   Moves all extremities well, no edema, no cyanosis, no             redness   Skin:   No bleeding, bruising or rash tattoos left forearm piercing at superior umbilicus   Lymph nodes:   No  palpable adenopathy   Neurologic:  Grossly intact, sensation intact       Results Review:   I reviewed the patient's new clinical results.      Assessment/Plan       Acute cholecystitis    Gallstones    Biliary acute pancreatitis without necrosis or infection        I discussed the patient's findings and my recommendations with patient, nursing staff and consulting provider     Agree with n.p.o. status and IV fluids.  Recommend laparoscopic cholecystectomy under optical angiogram provided the pancreatitis continues to improve and I would do her surgery prior to her discharge.  Dr. Aguirre covering for me this weekend.        This document has been electronically signed by Sid Mcclelland MD on November 20, 2020 11:53 CST     Sid Mcclelland MD  11/20/20  11:53 CST

## 2020-11-21 ENCOUNTER — ANESTHESIA EVENT (OUTPATIENT)
Dept: PERIOP | Facility: HOSPITAL | Age: 20
End: 2020-11-21

## 2020-11-21 ENCOUNTER — ANESTHESIA (OUTPATIENT)
Dept: PERIOP | Facility: HOSPITAL | Age: 20
End: 2020-11-21

## 2020-11-21 LAB
ALBUMIN SERPL-MCNC: 4.1 G/DL (ref 3.5–5.2)
ALBUMIN/GLOB SERPL: 1.5 G/DL
ALP SERPL-CCNC: 131 U/L (ref 39–117)
ALT SERPL W P-5'-P-CCNC: 381 U/L (ref 1–33)
ANION GAP SERPL CALCULATED.3IONS-SCNC: 11 MMOL/L (ref 5–15)
AST SERPL-CCNC: 106 U/L (ref 1–32)
BASOPHILS # BLD AUTO: 0.03 10*3/MM3 (ref 0–0.2)
BASOPHILS NFR BLD AUTO: 0.6 % (ref 0–1.5)
BILIRUB SERPL-MCNC: 0.6 MG/DL (ref 0–1.2)
BUN SERPL-MCNC: 12 MG/DL (ref 6–20)
BUN/CREAT SERPL: 16.9 (ref 7–25)
CALCIUM SPEC-SCNC: 9.5 MG/DL (ref 8.6–10.5)
CHLORIDE SERPL-SCNC: 102 MMOL/L (ref 98–107)
CO2 SERPL-SCNC: 23 MMOL/L (ref 22–29)
CREAT SERPL-MCNC: 0.71 MG/DL (ref 0.57–1)
DEPRECATED RDW RBC AUTO: 38.5 FL (ref 37–54)
EOSINOPHIL # BLD AUTO: 0.12 10*3/MM3 (ref 0–0.4)
EOSINOPHIL NFR BLD AUTO: 2.3 % (ref 0.3–6.2)
ERYTHROCYTE [DISTWIDTH] IN BLOOD BY AUTOMATED COUNT: 12.6 % (ref 12.3–15.4)
GFR SERPL CREATININE-BSD FRML MDRD: 103 ML/MIN/1.73
GLOBULIN UR ELPH-MCNC: 2.8 GM/DL
GLUCOSE SERPL-MCNC: 72 MG/DL (ref 65–99)
HCT VFR BLD AUTO: 38.1 % (ref 34–46.6)
HGB BLD-MCNC: 12.2 G/DL (ref 12–15.9)
IMM GRANULOCYTES # BLD AUTO: 0.01 10*3/MM3 (ref 0–0.05)
IMM GRANULOCYTES NFR BLD AUTO: 0.2 % (ref 0–0.5)
LIPASE SERPL-CCNC: 86 U/L (ref 13–60)
LYMPHOCYTES # BLD AUTO: 2.09 10*3/MM3 (ref 0.7–3.1)
LYMPHOCYTES NFR BLD AUTO: 40.8 % (ref 19.6–45.3)
MCH RBC QN AUTO: 27.2 PG (ref 26.6–33)
MCHC RBC AUTO-ENTMCNC: 32 G/DL (ref 31.5–35.7)
MCV RBC AUTO: 84.9 FL (ref 79–97)
MONOCYTES # BLD AUTO: 0.3 10*3/MM3 (ref 0.1–0.9)
MONOCYTES NFR BLD AUTO: 5.9 % (ref 5–12)
NEUTROPHILS NFR BLD AUTO: 2.57 10*3/MM3 (ref 1.7–7)
NEUTROPHILS NFR BLD AUTO: 50.2 % (ref 42.7–76)
NRBC BLD AUTO-RTO: 0 /100 WBC (ref 0–0.2)
PLATELET # BLD AUTO: 199 10*3/MM3 (ref 140–450)
PMV BLD AUTO: 10.4 FL (ref 6–12)
POTASSIUM SERPL-SCNC: 3.7 MMOL/L (ref 3.5–5.2)
PROT SERPL-MCNC: 6.9 G/DL (ref 6–8.5)
RBC # BLD AUTO: 4.49 10*6/MM3 (ref 3.77–5.28)
SODIUM SERPL-SCNC: 136 MMOL/L (ref 136–145)
WBC # BLD AUTO: 5.12 10*3/MM3 (ref 3.4–10.8)

## 2020-11-21 PROCEDURE — S0260 H&P FOR SURGERY: HCPCS | Performed by: SURGERY

## 2020-11-21 PROCEDURE — 25010000002 DEXAMETHASONE PER 1 MG: Performed by: NURSE ANESTHETIST, CERTIFIED REGISTERED

## 2020-11-21 PROCEDURE — 80053 COMPREHEN METABOLIC PANEL: CPT | Performed by: INTERNAL MEDICINE

## 2020-11-21 PROCEDURE — 96366 THER/PROPH/DIAG IV INF ADDON: CPT

## 2020-11-21 PROCEDURE — 25010000002 CEFTRIAXONE PER 250 MG: Performed by: INTERNAL MEDICINE

## 2020-11-21 PROCEDURE — 47562 LAPAROSCOPIC CHOLECYSTECTOMY: CPT | Performed by: SPECIALIST/TECHNOLOGIST, OTHER

## 2020-11-21 PROCEDURE — 99213 OFFICE O/P EST LOW 20 MIN: CPT | Performed by: INTERNAL MEDICINE

## 2020-11-21 PROCEDURE — 25010000002 KETOROLAC TROMETHAMINE PER 15 MG: Performed by: NURSE ANESTHETIST, CERTIFIED REGISTERED

## 2020-11-21 PROCEDURE — 96376 TX/PRO/DX INJ SAME DRUG ADON: CPT

## 2020-11-21 PROCEDURE — 25010000002 ONDANSETRON PER 1 MG: Performed by: SURGERY

## 2020-11-21 PROCEDURE — 83690 ASSAY OF LIPASE: CPT | Performed by: INTERNAL MEDICINE

## 2020-11-21 PROCEDURE — 25010000002 PROPOFOL 10 MG/ML EMULSION: Performed by: NURSE ANESTHETIST, CERTIFIED REGISTERED

## 2020-11-21 PROCEDURE — 85025 COMPLETE CBC W/AUTO DIFF WBC: CPT | Performed by: INTERNAL MEDICINE

## 2020-11-21 PROCEDURE — 47562 LAPAROSCOPIC CHOLECYSTECTOMY: CPT | Performed by: SURGERY

## 2020-11-21 PROCEDURE — G0378 HOSPITAL OBSERVATION PER HR: HCPCS

## 2020-11-21 PROCEDURE — 25010000002 NEOSTIGMINE 4 MG/4ML SOLUTION PREFILLED SYRINGE: Performed by: NURSE ANESTHETIST, CERTIFIED REGISTERED

## 2020-11-21 PROCEDURE — 25010000002 ONDANSETRON PER 1 MG: Performed by: NURSE ANESTHETIST, CERTIFIED REGISTERED

## 2020-11-21 PROCEDURE — 25010000002 MIDAZOLAM PER 1 MG: Performed by: NURSE ANESTHETIST, CERTIFIED REGISTERED

## 2020-11-21 PROCEDURE — 88304 TISSUE EXAM BY PATHOLOGIST: CPT

## 2020-11-21 PROCEDURE — 25010000002 ONDANSETRON PER 1 MG: Performed by: INTERNAL MEDICINE

## 2020-11-21 PROCEDURE — 25010000002 HYDROMORPHONE PER 4 MG: Performed by: NURSE ANESTHETIST, CERTIFIED REGISTERED

## 2020-11-21 DEVICE — LIGAMAX 5 MM ENDOSCOPIC MULTIPLE CLIP APPLIER
Type: IMPLANTABLE DEVICE | Site: ABDOMEN | Status: FUNCTIONAL
Brand: LIGAMAX

## 2020-11-21 RX ORDER — ONDANSETRON 2 MG/ML
INJECTION INTRAMUSCULAR; INTRAVENOUS AS NEEDED
Status: DISCONTINUED | OUTPATIENT
Start: 2020-11-21 | End: 2020-11-21 | Stop reason: SURG

## 2020-11-21 RX ORDER — HYDROCODONE BITARTRATE AND ACETAMINOPHEN 5; 325 MG/1; MG/1
1 TABLET ORAL EVERY 4 HOURS PRN
Status: DISCONTINUED | OUTPATIENT
Start: 2020-11-21 | End: 2020-11-22 | Stop reason: HOSPADM

## 2020-11-21 RX ORDER — ACETAMINOPHEN 650 MG/1
650 SUPPOSITORY RECTAL ONCE AS NEEDED
Status: DISCONTINUED | OUTPATIENT
Start: 2020-11-21 | End: 2020-11-21 | Stop reason: HOSPADM

## 2020-11-21 RX ORDER — ROCURONIUM BROMIDE 10 MG/ML
INJECTION, SOLUTION INTRAVENOUS AS NEEDED
Status: DISCONTINUED | OUTPATIENT
Start: 2020-11-21 | End: 2020-11-21 | Stop reason: SURG

## 2020-11-21 RX ORDER — PROPOFOL 10 MG/ML
VIAL (ML) INTRAVENOUS AS NEEDED
Status: DISCONTINUED | OUTPATIENT
Start: 2020-11-21 | End: 2020-11-21 | Stop reason: SURG

## 2020-11-21 RX ORDER — NALOXONE HCL 0.4 MG/ML
0.1 VIAL (ML) INJECTION
Status: DISCONTINUED | OUTPATIENT
Start: 2020-11-21 | End: 2020-11-22 | Stop reason: HOSPADM

## 2020-11-21 RX ORDER — ONDANSETRON 2 MG/ML
4 INJECTION INTRAMUSCULAR; INTRAVENOUS EVERY 6 HOURS PRN
Status: DISCONTINUED | OUTPATIENT
Start: 2020-11-21 | End: 2020-11-22 | Stop reason: HOSPADM

## 2020-11-21 RX ORDER — KETAMINE HYDROCHLORIDE 100 MG/ML
INJECTION INTRAMUSCULAR; INTRAVENOUS AS NEEDED
Status: DISCONTINUED | OUTPATIENT
Start: 2020-11-21 | End: 2020-11-21 | Stop reason: SURG

## 2020-11-21 RX ORDER — MEPERIDINE HYDROCHLORIDE 25 MG/ML
12.5 INJECTION INTRAMUSCULAR; INTRAVENOUS; SUBCUTANEOUS
Status: DISCONTINUED | OUTPATIENT
Start: 2020-11-21 | End: 2020-11-21 | Stop reason: HOSPADM

## 2020-11-21 RX ORDER — DEXAMETHASONE SODIUM PHOSPHATE 4 MG/ML
INJECTION, SOLUTION INTRA-ARTICULAR; INTRALESIONAL; INTRAMUSCULAR; INTRAVENOUS; SOFT TISSUE AS NEEDED
Status: DISCONTINUED | OUTPATIENT
Start: 2020-11-21 | End: 2020-11-21 | Stop reason: SURG

## 2020-11-21 RX ORDER — NEOSTIGMINE METHYLSULFATE 4 MG/4 ML
SYRINGE (ML) INTRAVENOUS AS NEEDED
Status: DISCONTINUED | OUTPATIENT
Start: 2020-11-21 | End: 2020-11-21 | Stop reason: SURG

## 2020-11-21 RX ORDER — FLUMAZENIL 0.1 MG/ML
0.2 INJECTION INTRAVENOUS AS NEEDED
Status: DISCONTINUED | OUTPATIENT
Start: 2020-11-21 | End: 2020-11-21 | Stop reason: HOSPADM

## 2020-11-21 RX ORDER — EPHEDRINE SULFATE 50 MG/ML
5 INJECTION, SOLUTION INTRAVENOUS ONCE AS NEEDED
Status: DISCONTINUED | OUTPATIENT
Start: 2020-11-21 | End: 2020-11-21 | Stop reason: HOSPADM

## 2020-11-21 RX ORDER — LABETALOL HYDROCHLORIDE 5 MG/ML
5 INJECTION, SOLUTION INTRAVENOUS
Status: DISCONTINUED | OUTPATIENT
Start: 2020-11-21 | End: 2020-11-21 | Stop reason: HOSPADM

## 2020-11-21 RX ORDER — HYDROMORPHONE HCL 110MG/55ML
PATIENT CONTROLLED ANALGESIA SYRINGE INTRAVENOUS AS NEEDED
Status: DISCONTINUED | OUTPATIENT
Start: 2020-11-21 | End: 2020-11-21 | Stop reason: SURG

## 2020-11-21 RX ORDER — KETOROLAC TROMETHAMINE 30 MG/ML
INJECTION, SOLUTION INTRAMUSCULAR; INTRAVENOUS AS NEEDED
Status: DISCONTINUED | OUTPATIENT
Start: 2020-11-21 | End: 2020-11-21 | Stop reason: SURG

## 2020-11-21 RX ORDER — LIDOCAINE HYDROCHLORIDE 20 MG/ML
INJECTION, SOLUTION INFILTRATION; PERINEURAL AS NEEDED
Status: DISCONTINUED | OUTPATIENT
Start: 2020-11-21 | End: 2020-11-21 | Stop reason: SURG

## 2020-11-21 RX ORDER — ACETAMINOPHEN 325 MG/1
650 TABLET ORAL ONCE AS NEEDED
Status: DISCONTINUED | OUTPATIENT
Start: 2020-11-21 | End: 2020-11-21 | Stop reason: HOSPADM

## 2020-11-21 RX ORDER — DIPHENHYDRAMINE HYDROCHLORIDE 50 MG/ML
12.5 INJECTION INTRAMUSCULAR; INTRAVENOUS
Status: DISCONTINUED | OUTPATIENT
Start: 2020-11-21 | End: 2020-11-21 | Stop reason: HOSPADM

## 2020-11-21 RX ORDER — ONDANSETRON 2 MG/ML
4 INJECTION INTRAMUSCULAR; INTRAVENOUS ONCE AS NEEDED
Status: DISCONTINUED | OUTPATIENT
Start: 2020-11-21 | End: 2020-11-21 | Stop reason: HOSPADM

## 2020-11-21 RX ORDER — SODIUM CHLORIDE 9 MG/ML
75 INJECTION, SOLUTION INTRAVENOUS CONTINUOUS
Status: DISCONTINUED | OUTPATIENT
Start: 2020-11-21 | End: 2020-11-22

## 2020-11-21 RX ORDER — MIDAZOLAM HYDROCHLORIDE 1 MG/ML
INJECTION INTRAMUSCULAR; INTRAVENOUS AS NEEDED
Status: DISCONTINUED | OUTPATIENT
Start: 2020-11-21 | End: 2020-11-21 | Stop reason: SURG

## 2020-11-21 RX ORDER — ONDANSETRON 4 MG/1
4 TABLET, FILM COATED ORAL EVERY 6 HOURS PRN
Status: DISCONTINUED | OUTPATIENT
Start: 2020-11-21 | End: 2020-11-22 | Stop reason: HOSPADM

## 2020-11-21 RX ORDER — PROMETHAZINE HYDROCHLORIDE 25 MG/1
25 TABLET ORAL ONCE AS NEEDED
Status: DISCONTINUED | OUTPATIENT
Start: 2020-11-21 | End: 2020-11-21 | Stop reason: HOSPADM

## 2020-11-21 RX ORDER — BUPIVACAINE HYDROCHLORIDE 5 MG/ML
INJECTION, SOLUTION EPIDURAL; INTRACAUDAL AS NEEDED
Status: DISCONTINUED | OUTPATIENT
Start: 2020-11-21 | End: 2020-11-21 | Stop reason: HOSPADM

## 2020-11-21 RX ORDER — PROMETHAZINE HYDROCHLORIDE 25 MG/1
25 SUPPOSITORY RECTAL ONCE AS NEEDED
Status: DISCONTINUED | OUTPATIENT
Start: 2020-11-21 | End: 2020-11-21 | Stop reason: HOSPADM

## 2020-11-21 RX ADMIN — LIDOCAINE HYDROCHLORIDE 100 MG: 20 INJECTION, SOLUTION INFILTRATION; PERINEURAL at 13:26

## 2020-11-21 RX ADMIN — METRONIDAZOLE 500 MG: 500 INJECTION, SOLUTION INTRAVENOUS at 11:16

## 2020-11-21 RX ADMIN — GLYCOPYRROLATE 0.2 MG: 0.2 INJECTION, SOLUTION INTRAMUSCULAR; INTRAVITREAL at 13:35

## 2020-11-21 RX ADMIN — ROCURONIUM BROMIDE 20 MG: 10 INJECTION INTRAVENOUS at 13:42

## 2020-11-21 RX ADMIN — CEFTRIAXONE SODIUM 2 G: 2 INJECTION, POWDER, FOR SOLUTION INTRAMUSCULAR; INTRAVENOUS at 08:23

## 2020-11-21 RX ADMIN — KETAMINE HYDROCHLORIDE 20 MG: 100 INJECTION INTRAMUSCULAR; INTRAVENOUS at 13:40

## 2020-11-21 RX ADMIN — DEXAMETHASONE SODIUM PHOSPHATE 12 MG: 4 INJECTION, SOLUTION INTRAMUSCULAR; INTRAVENOUS at 13:39

## 2020-11-21 RX ADMIN — ONDANSETRON 4 MG: 2 INJECTION INTRAMUSCULAR; INTRAVENOUS at 12:27

## 2020-11-21 RX ADMIN — SODIUM CHLORIDE, POTASSIUM CHLORIDE, SODIUM LACTATE AND CALCIUM CHLORIDE: 600; 310; 30; 20 INJECTION, SOLUTION INTRAVENOUS at 13:15

## 2020-11-21 RX ADMIN — ONDANSETRON 4 MG: 2 INJECTION INTRAMUSCULAR; INTRAVENOUS at 17:57

## 2020-11-21 RX ADMIN — HYDROMORPHONE HYDROCHLORIDE 2 MG: 2 INJECTION, SOLUTION INTRAMUSCULAR; INTRAVENOUS; SUBCUTANEOUS at 13:21

## 2020-11-21 RX ADMIN — KETAMINE HYDROCHLORIDE 50 MG: 100 INJECTION INTRAMUSCULAR; INTRAVENOUS at 13:26

## 2020-11-21 RX ADMIN — MIDAZOLAM HYDROCHLORIDE 2 MG: 2 INJECTION, SOLUTION INTRAMUSCULAR; INTRAVENOUS at 13:19

## 2020-11-21 RX ADMIN — PROPOFOL 100 MG: 10 INJECTION, EMULSION INTRAVENOUS at 13:26

## 2020-11-21 RX ADMIN — METRONIDAZOLE 500 MG: 500 INJECTION, SOLUTION INTRAVENOUS at 03:48

## 2020-11-21 RX ADMIN — GLYCOPYRROLATE 0.2 MG: 0.2 INJECTION, SOLUTION INTRAMUSCULAR; INTRAVITREAL at 14:36

## 2020-11-21 RX ADMIN — SODIUM CHLORIDE 75 ML/HR: 9 INJECTION, SOLUTION INTRAVENOUS at 15:41

## 2020-11-21 RX ADMIN — KETOROLAC TROMETHAMINE 30 MG: 30 INJECTION, SOLUTION INTRAMUSCULAR at 14:14

## 2020-11-21 RX ADMIN — GLYCOPYRROLATE 0.4 MG: 0.2 INJECTION, SOLUTION INTRAMUSCULAR; INTRAVITREAL at 14:12

## 2020-11-21 RX ADMIN — METRONIDAZOLE 500 MG: 500 INJECTION, SOLUTION INTRAVENOUS at 20:16

## 2020-11-21 RX ADMIN — ONDANSETRON 4 MG: 2 INJECTION INTRAMUSCULAR; INTRAVENOUS at 13:19

## 2020-11-21 RX ADMIN — HYDROCODONE BITARTRATE AND ACETAMINOPHEN 1 TABLET: 5; 325 TABLET ORAL at 20:15

## 2020-11-21 RX ADMIN — SODIUM CHLORIDE, POTASSIUM CHLORIDE, SODIUM LACTATE AND CALCIUM CHLORIDE 125 ML/HR: 600; 310; 30; 20 INJECTION, SOLUTION INTRAVENOUS at 08:23

## 2020-11-21 RX ADMIN — Medication 3 MG: at 14:12

## 2020-11-21 RX ADMIN — ROCURONIUM BROMIDE 2.5 MG: 10 INJECTION INTRAVENOUS at 13:20

## 2020-11-21 NOTE — PROGRESS NOTES
[facility] Medicine Services  INPATIENT PROGRESS NOTE    Length of Stay: 0  Date of Admission: 11/19/2020  Primary Care Physician: Liv Snyder    Subjective   Chief Complaint: Abdominal pain    HPI: Patient states her abdominal pain is improved and is now 5/10 intensity in the right upper quadrant.  She denies nausea or vomiting.  She feels hungry and wants to eat.  Transaminases are trending down and a total bilirubin is normalized this morning.  MRCP this morning did not show bile duct dilatation or CBD stone.    Review of Systems   Constitutional: Negative for appetite change, chills, fatigue and fever.   HENT: Negative for congestion and sore throat.    Eyes: Negative for pain and redness.   Respiratory: Negative for cough, chest tightness, shortness of breath and wheezing.    Cardiovascular: Negative for chest pain, palpitations and leg swelling.   Gastrointestinal: Positive for abdominal pain. Negative for constipation, diarrhea, nausea and vomiting.   Genitourinary: Negative for dysuria and hematuria.   Musculoskeletal: Negative for arthralgias, joint swelling and neck pain.   Skin: Negative for color change and rash.   Neurological: Negative for dizziness, syncope, light-headedness and headaches.   Hematological: Negative for adenopathy.   Psychiatric/Behavioral: Negative for agitation and confusion. The patient is not nervous/anxious.      Objective    Temp:  [97.2 °F (36.2 °C)-98.9 °F (37.2 °C)] 98.1 °F (36.7 °C)  Heart Rate:  [66-87] 87  Resp:  [18] 18  BP: (117-142)/(74-95) 117/78    Physical Exam  Constitutional:       General: She is not in acute distress.     Appearance: She is not ill-appearing or diaphoretic.   HENT:      Head: Normocephalic and atraumatic.      Right Ear: External ear normal.      Left Ear: External ear normal.      Nose: No congestion or rhinorrhea.      Mouth/Throat:      Mouth: Mucous membranes are moist.      Pharynx: No  oropharyngeal exudate or posterior oropharyngeal erythema.   Eyes:      General: No scleral icterus.     Extraocular Movements: Extraocular movements intact.      Conjunctiva/sclera: Conjunctivae normal.   Neck:      Musculoskeletal: Neck supple. No neck rigidity or muscular tenderness.   Cardiovascular:      Rate and Rhythm: Normal rate and regular rhythm.      Heart sounds: Normal heart sounds. No murmur.   Pulmonary:      Effort: Pulmonary effort is normal. No respiratory distress.      Breath sounds: Normal breath sounds. No wheezing, rhonchi or rales.   Abdominal:      General: Abdomen is flat. There is no distension.      Palpations: Abdomen is soft.      Tenderness: There is abdominal tenderness. There is no guarding.      Comments: Right upper quadrant and epigastric tenderness noted   Musculoskeletal:         General: No swelling, tenderness or deformity.      Right lower leg: No edema.      Left lower leg: No edema.   Lymphadenopathy:      Cervical: No cervical adenopathy.   Skin:     General: Skin is warm and dry.   Neurological:      General: No focal deficit present.      Mental Status: She is alert and oriented to person, place, and time.      Cranial Nerves: No cranial nerve deficit.      Motor: No weakness.   Psychiatric:         Mood and Affect: Mood normal.         Behavior: Behavior normal.         Thought Content: Thought content normal.       Medication Review:    Current Facility-Administered Medications:   •  acetaminophen (TYLENOL) tablet 650 mg, 650 mg, Oral, Q4H PRN, Heaven Shin MD  •  [START ON 11/21/2020] cefTRIAXone (ROCEPHIN) 2 g/100 mL 0.9% NS IVPB (MBP), 2 g, Intravenous, Q24H, Heaven Shin MD  •  lactated ringers infusion, 125 mL/hr, Intravenous, Continuous, Heaven Shin MD, Last Rate: 125 mL/hr at 11/20/20 1842, 125 mL/hr at 11/20/20 1842  •  metroNIDAZOLE (FLAGYL) 500 mg/100mL IVPB, 500 mg, Intravenous, Q8H, Heaven Shin MD, Stopped at 11/20/20 1259  •   morphine injection 2 mg, 2 mg, Intravenous, Q4H PRN, 2 mg at 11/19/20 1954 **AND** naloxone (NARCAN) injection 0.4 mg, 0.4 mg, Intravenous, Q5 Min PRN, Heaven Shin MD  •  ondansetron (ZOFRAN) injection 4 mg, 4 mg, Intravenous, Q6H PRN, Heaven Shin MD, 4 mg at 11/20/20 1258  •  [COMPLETED] Insert peripheral IV, , , Once **AND** sodium chloride 0.9 % flush 10 mL, 10 mL, Intravenous, PRN, Heaven Shin MD  •  sodium chloride 0.9 % flush 10 mL, 10 mL, Intravenous, Q12H, Heaven Shin MD, 10 mL at 11/19/20 2200  •  sodium chloride 0.9 % flush 10 mL, 10 mL, Intravenous, PRN, Heaven Shin MD    I have reviewed the patient's current medications.     Results Review:  I have reviewed the labs, radiology results, and diagnostic studies.    Laboratory Data:   Results from last 7 days   Lab Units 11/20/20  0611 11/19/20  1507   SODIUM mmol/L 139 139   POTASSIUM mmol/L 3.7 3.9   CHLORIDE mmol/L 103 100   CO2 mmol/L 22.0 27.0   BUN mg/dL 10 12   CREATININE mg/dL 0.68 0.83   GLUCOSE mg/dL 97 119*   CALCIUM mg/dL 9.5 10.1   BILIRUBIN mg/dL 1.0 3.6*   ALK PHOS U/L 157* 169*   ALT (SGPT) U/L 629* 925*   AST (SGOT) U/L 378* 903*   ANION GAP mmol/L 14.0 12.0     Estimated Creatinine Clearance: 131.7 mL/min (by C-G formula based on SCr of 0.68 mg/dL).          Results from last 7 days   Lab Units 11/20/20  0611 11/19/20  1507   WBC 10*3/mm3 4.79 4.62   HEMOGLOBIN g/dL 12.3 14.1   HEMATOCRIT % 38.2 42.3   PLATELETS 10*3/mm3 210 242           Culture Data:   No results found for: BLOODCX  No results found for: URINECX  No results found for: RESPCX  No results found for: WOUNDCX  No results found for: STOOLCX  No components found for: BODYFLD    Radiology Data:   Imaging Results (Last 24 Hours)     Procedure Component Value Units Date/Time    MRI abdomen wo contrast mrcp [[id number]] Collected: 11/20/20 0925     Updated: 11/20/20 1129    Narrative:      EXAM: MR CHOLANGIOPANCREATOGRAPHY  (MRCP)    COMPARISONS: Ultrasound 11/19/2020.    INDICATION: Abdominal pain, biliary obstruction suspected (Ped  0-18y)  , K80.20 Calculus of gallbladder without cholecystitis without  obstruction R74.8 Abnormal levels of other serum enzymes N39.0  Urinary tract infection, site not specified: Gallstone  pancreatitis    TECHNIQUE: Multiplanar, multisequence MR sequences of the  epigastrium were obtained without the use of gadolinium  intravenous contrast. 3-D MIP images of the biliary system were  provided.    FINDINGS:   The liver is normal in size, signal and contour. 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 No focal  hepatic lesion. There is no intra or extrahepatic ductal  dilatation.    The common bile duct is of normal course and caliber without  evidence of filling defect or focal stenosis. The diameter of the  common bile duct measures 4-5 mm.     There are multiple dependent low signal stones seen within the  gallbladder lumen. The gallbladder wall is thickened to 4 to 5  mm. There is a small amount of pericholecystic fluid.    The pancreatic duct is normal in course and caliber. The  pancreatic parenchyma is of normal signal.    The kidneys are of normal signal without focal abnormality. There  is no dilatation of the proximal collecting system.    The spleen measures 14.0 cm in AP dimension and is otherwise  normal.    The remaining visualized upper abdominal contents are  unremarkable.     The imaged bone marrow, musculature, and lung bases are within  normal limits.      Impression:      Cholelithiasis with thickened gallbladder wall and trace amount  of pericholecystic fluid. In presence of right upper quadrant  pain, this can be seen with acute cholecystitis.    Question splenomegaly versus patient anatomic baseline. Correlate  with exam and laboratory values.    Electronically signed by:  Kaitlynn Murphy MD  11/20/2020  11:27 AM [facility] Workstation: [id number]           Assessment/Plan     Hospital Problem List:  Principal Problem:    Acute cholecystitis  Active Problems:    Gallstones    Biliary acute pancreatitis without necrosis or infection    Plan  -Patient has acute cholecystitis and laboratory findings consistent with gallstone pancreatitis  -Transaminases are trending down and her total bilirubin has normalized.  -We will continue patient n.p.o. for another 24 hours and can start clear liquid diet in the morning  -General surgery has been consulted.  Tentatively plan for cholecystectomy surgery over the weekend, or on Monday.  Patient should have cholecystectomy prior to discharge.  -Gastroenterology input appreciated.  -Pain control with IV morphine as needed  -Continue antibiotic coverage with IV ceftriaxone and Flagyl  -Monitor liver function test and CBC for leukocytosis  -Monitor vital signs closely for fever  -General surgery consultation has been sent.  -DVT prophylaxis with SCDs  -CODE STATUS is full code      I discussed the patient's findings and my recommendations with patient.      Discharge Planning: In progress    Heaven Shin MD   11/20/20   19:41 CST

## 2020-11-21 NOTE — PLAN OF CARE
Goal Outcome Evaluation:  Plan of Care Reviewed With: patient  Progress: no change   Patient continuing NPO status and IV abx for gallbladder induced pancreatitis anticipated a adam this weekend or Monday.

## 2020-11-21 NOTE — OP NOTE
Operative Note    Oneida Patel  11/21/2020    Pre-op Diagnosis:   Gallstones [K80.20], gallstone pancreatitis    Post-op Diagnosis:     Post-Op Diagnosis Codes:     * Gallstones [K80.20] , gallstone pancreatitis    Procedure/CPT® Codes:      Procedure(s):  CHOLECYSTECTOMY LAPAROSCOPIC    Surgeon(s):  Evan Aguirre MD    Anesthesia: General    Staff:   Circulator: Shobha Quintanilla RN  Scrub Person: Lary Linares  Assistant: Janelle Marin CSA    Estimated Blood Loss: minimal    Specimens:                ID Type Source Tests Collected by Time   A (Not marked as sent) : Gallbladder with contents  Tissue Gallbladder TISSUE PATHOLOGY EXAM Evan Aguirre MD 11/21/2020 1358         Drains: * No LDAs found *    Findings: gallstones    Complications: none    Indication: Gallstone Pancreatitis    Operative Note:    Patient was seen and consented preop.  Taken to OR and placed in supine position.  General anesthetic administered and orotracheally intubated without issue.  Briefing was then performed and the abdomen prepped and draped. Timeout was then performed.    Following timeout local was injected below the right subcostal margin at the midclavicular line.  A skin incision was made and the 5mm optical viewing port with the scope inserted was then used to enter the abdomen without issue.  The abdomen was then insufflated without issue.  The area below trocar insertion was then examined and was without injury.      Additional ports were then inserted.  An 11mm port was placed just below the umbilicus.  Two 5mm ports were then placed in the subxiphoid and right anterior axillary line positions.  All ports were placed after injection of local and under direct vision.    The patient was then placed in reverse Trendelenberg right side up position.  The fundus of the gallbladder was identified, grasped and elevated cephalad.  The few adhesions of the omentum were stripped away revealing the  body and infundibulum of the gallbladder.  The infundibulum was grasped and elevated outward.  Hook cautery was used to divide the peritoneum at the medial and lateral gallbladder neck.  The remaining peritoneum was stripped away and blunt dissection undertaken until a critical view of safety was achieved.  Clips were then placed: 2 on the downside of the artery, 1 on the upside. A Harris clamp was placed across the base of the gallbladder after a stone was displaced from this area.  I attempted to cannulate the cystic duct with the needle from the Harris clamp but this appeared to be unsuccessful.  A clip was then placed at the base of the gallbladder and a ductotomy made.  The catheter from the Harris clamp was attempted to be passed into the duct but this too was unsuccessful.  Given that the patient had had a negative MRCP the day prior to surgery further attempts at obtaining a cholangiogram were aborted.  Two clips were placed on the downside of the duct which was then divided, as was the artery.    Hook cautery was then used to remove the gallbladder from its fossa.  It was placed into an EndoCatch bag and retrieved through the umbilical port site.  The abdomen was then reinsufflated and the area of dissection inspected and found to be hemostatic.  The RUQ was irrigated and the patient returned to a neutral position.  The umbilical port was then removed and the port site closed with 0 Vicryl.  The abdomen was then reinsufflated and the closure inspected and found adequate.  The abdomen was then desufflated and the ports removed.  All skin incisions closed with 4-0 Vicryl and Skin Affix.    Assistant: Janelle Marin CSA was responsible for performing the following activities: Retraction, Placing Dressing and Held/Positioned Camera and their skilled assistance was necessary for the success of this case.          This document has been electronically signed by Evan Aguirre MD on November 21, 2020  14:30 CST      Evan Aguirre MD     Date: 11/21/2020  Time: 14:28 CST

## 2020-11-21 NOTE — ANESTHESIA POSTPROCEDURE EVALUATION
Patient: Oneida Patel    Procedure Summary     Date: 11/21/20 Room / Location: [facility] OR 09 / [facility] OR    Anesthesia Start: 1317 Anesthesia Stop: 1443    Procedure: CHOLECYSTECTOMY LAPAROSCOPIC (N/A Abdomen) Diagnosis:       Gallstones      (Gallstones [K80.20])    Surgeon: Evan Aguirre MD Provider: Ezra Graff MD    Anesthesia Type: general ASA Status: 4 - Emergent          Anesthesia Type: general    Vitals  Vitals Value Taken Time   /85 11/21/20 1429   Temp 98 °F (36.7 °C) 11/21/20 1429   Pulse 55 11/21/20 1429   Resp 20 11/21/20 1429   SpO2 100 % 11/21/20 1429           Post Anesthesia Care and Evaluation    Patient location during evaluation: bedside  Patient participation: complete - patient participated  Level of consciousness: sleepy but conscious  Pain score: 0  Pain management: adequate  Airway patency: patent  Anesthetic complications: No anesthetic complications  PONV Status: none  Cardiovascular status: acceptable and stable  Respiratory status: acceptable  Hydration status: stable

## 2020-11-21 NOTE — PROGRESS NOTES
GENERAL SURGERY PROGRESS NOTE  Chief Complaint:  Surgery Follow up   LOS: 0 days       Subjective     Interval History:     Feels well this AM. Minimal discomfort. Little nausea.    Objective     Vital Signs  Temp:  [97.2 °F (36.2 °C)-98.1 °F (36.7 °C)] 97.6 °F (36.4 °C)  Heart Rate:  [66-87] 77  Resp:  [18] 18  BP: (112-134)/(68-78) 112/76    Physical Exam:   Abdomen soft, nontender  Labs:  Lab Results (last 24 hours)     Procedure Component Value Units Date/Time    Comprehensive Metabolic Panel [[id number]]  (Abnormal) Collected: 11/21/20 0634    Specimen: Blood Updated: 11/21/20 0742     Glucose 72 mg/dL      BUN 12 mg/dL      Creatinine 0.71 mg/dL      Sodium 136 mmol/L      Potassium 3.7 mmol/L      Chloride 102 mmol/L      CO2 23.0 mmol/L      Calcium 9.5 mg/dL      Total Protein 6.9 g/dL      Albumin 4.10 g/dL      ALT (SGPT) 381 U/L      AST (SGOT) 106 U/L      Alkaline Phosphatase 131 U/L      Total Bilirubin 0.6 mg/dL      eGFR Non African Amer 103 mL/min/1.73      Globulin 2.8 gm/dL      A/G Ratio 1.5 g/dL      BUN/Creatinine Ratio 16.9     Anion Gap 11.0 mmol/L     Narrative:      GFR Normal >60  Chronic Kidney Disease <60  Kidney Failure <15      Lipase [[id number]]  (Abnormal) Collected: 11/21/20 0634    Specimen: Blood Updated: 11/21/20 0742     Lipase 86 U/L     CBC & Differential [[id number]]  (Normal) Collected: 11/21/20 0634    Specimen: Blood Updated: 11/21/20 0722    Narrative:      The following orders were created for panel order CBC & Differential.  Procedure                               Abnormality         Status                     ---------                               -----------         ------                     CBC Auto Differential[[id number]]        Normal              Final result                 Please view results for these tests on the individual orders.    CBC Auto Differential [[id number]]  (Normal) Collected: 11/21/20 0634    Specimen: Blood Updated: 11/21/20 0722     WBC 5.12  10*3/mm3      RBC 4.49 10*6/mm3      Hemoglobin 12.2 g/dL      Hematocrit 38.1 %      MCV 84.9 fL      MCH 27.2 pg      MCHC 32.0 g/dL      RDW 12.6 %      RDW-SD 38.5 fl      MPV 10.4 fL      Platelets 199 10*3/mm3      Neutrophil % 50.2 %      Lymphocyte % 40.8 %      Monocyte % 5.9 %      Eosinophil % 2.3 %      Basophil % 0.6 %      Immature Grans % 0.2 %      Neutrophils, Absolute 2.57 10*3/mm3      Lymphocytes, Absolute 2.09 10*3/mm3      Monocytes, Absolute 0.30 10*3/mm3      Eosinophils, Absolute 0.12 10*3/mm3      Basophils, Absolute 0.03 10*3/mm3      Immature Grans, Absolute 0.01 10*3/mm3      nRBC 0.0 /100 WBC     Lipase [[id number]]  (Abnormal) Collected: 11/20/20 0611    Specimen: Blood Updated: 11/20/20 1017     Lipase 719 U/L            Results Review:     Labs and imaging for today were reviewed.    Assessment/Plan     Oneida Patel is a 20 y.o. female who has resolving gallstone pancreatitis and cholecystitis.      Overall improved.  Will plan for cholecystectomy today.  The risks and benefits of laparoscopic cholecystectomy, cholangiogram and possible open operation have been discussed.          This document has been electronically signed by Evan Aguirre MD on November 21, 2020 09:45 CST        Evan Aguirre MD  11/21/20  09:45 CST

## 2020-11-21 NOTE — ANESTHESIA PREPROCEDURE EVALUATION
Anesthesia Evaluation     Patient summary reviewed and Nursing notes reviewed   no history of anesthetic complications:  NPO Solid Status: > 8 hours  NPO Liquid Status: > 8 hours           Airway   Mallampati: I  TM distance: >3 FB  Neck ROM: full  possible difficult intubation  Dental - normal exam     Pulmonary - negative pulmonary ROS and normal exam    breath sounds clear to auscultation  (-) not a smoker  Cardiovascular - negative cardio ROS and normal exam    Rhythm: regular  Rate: normal    (-) murmur      Neuro/Psych- negative ROS  GI/Hepatic/Renal/Endo    (+) obesity,   liver disease (Gallstone pancreatitis ) history of elevated LFT,     Musculoskeletal (-) negative ROS    Abdominal   (+) obese,    Substance History - negative use     OB/GYN negative ob/gyn ROS   (-)  Pregnant        Other - negative ROS                       Anesthesia Plan    ASA 4 - emergent     general   Rapid sequence  intravenous induction     Anesthetic plan, all risks, benefits, and alternatives have been provided, discussed and informed consent has been obtained with: patient.

## 2020-11-22 VITALS
HEART RATE: 75 BPM | RESPIRATION RATE: 18 BRPM | BODY MASS INDEX: 29.71 KG/M2 | TEMPERATURE: 98.1 F | HEIGHT: 64 IN | SYSTOLIC BLOOD PRESSURE: 126 MMHG | WEIGHT: 174 LBS | OXYGEN SATURATION: 97 % | DIASTOLIC BLOOD PRESSURE: 60 MMHG

## 2020-11-22 LAB
ALBUMIN SERPL-MCNC: 4.4 G/DL (ref 3.5–5.2)
ALBUMIN/GLOB SERPL: 1.5 G/DL
ALP SERPL-CCNC: 118 U/L (ref 39–117)
ALT SERPL W P-5'-P-CCNC: 283 U/L (ref 1–33)
ANION GAP SERPL CALCULATED.3IONS-SCNC: 13 MMOL/L (ref 5–15)
AST SERPL-CCNC: 57 U/L (ref 1–32)
BASOPHILS # BLD AUTO: 0.02 10*3/MM3 (ref 0–0.2)
BASOPHILS NFR BLD AUTO: 0.3 % (ref 0–1.5)
BILIRUB SERPL-MCNC: 0.5 MG/DL (ref 0–1.2)
BUN SERPL-MCNC: 11 MG/DL (ref 6–20)
BUN/CREAT SERPL: 16.7 (ref 7–25)
CALCIUM SPEC-SCNC: 9.8 MG/DL (ref 8.6–10.5)
CHLORIDE SERPL-SCNC: 101 MMOL/L (ref 98–107)
CO2 SERPL-SCNC: 22 MMOL/L (ref 22–29)
CREAT SERPL-MCNC: 0.66 MG/DL (ref 0.57–1)
DEPRECATED RDW RBC AUTO: 37.7 FL (ref 37–54)
EOSINOPHIL # BLD AUTO: 0 10*3/MM3 (ref 0–0.4)
EOSINOPHIL NFR BLD AUTO: 0 % (ref 0.3–6.2)
ERYTHROCYTE [DISTWIDTH] IN BLOOD BY AUTOMATED COUNT: 12.6 % (ref 12.3–15.4)
GFR SERPL CREATININE-BSD FRML MDRD: 112 ML/MIN/1.73
GLOBULIN UR ELPH-MCNC: 2.9 GM/DL
GLUCOSE SERPL-MCNC: 85 MG/DL (ref 65–99)
HCT VFR BLD AUTO: 37.7 % (ref 34–46.6)
HGB BLD-MCNC: 12.4 G/DL (ref 12–15.9)
IMM GRANULOCYTES # BLD AUTO: 0.02 10*3/MM3 (ref 0–0.05)
IMM GRANULOCYTES NFR BLD AUTO: 0.3 % (ref 0–0.5)
LYMPHOCYTES # BLD AUTO: 1.13 10*3/MM3 (ref 0.7–3.1)
LYMPHOCYTES NFR BLD AUTO: 15.5 % (ref 19.6–45.3)
MCH RBC QN AUTO: 27.4 PG (ref 26.6–33)
MCHC RBC AUTO-ENTMCNC: 32.9 G/DL (ref 31.5–35.7)
MCV RBC AUTO: 83.4 FL (ref 79–97)
MONOCYTES # BLD AUTO: 0.41 10*3/MM3 (ref 0.1–0.9)
MONOCYTES NFR BLD AUTO: 5.6 % (ref 5–12)
NEUTROPHILS NFR BLD AUTO: 5.7 10*3/MM3 (ref 1.7–7)
NEUTROPHILS NFR BLD AUTO: 78.3 % (ref 42.7–76)
NRBC BLD AUTO-RTO: 0 /100 WBC (ref 0–0.2)
PLATELET # BLD AUTO: 247 10*3/MM3 (ref 140–450)
PMV BLD AUTO: 10.7 FL (ref 6–12)
POTASSIUM SERPL-SCNC: 3.9 MMOL/L (ref 3.5–5.2)
PROT SERPL-MCNC: 7.3 G/DL (ref 6–8.5)
RBC # BLD AUTO: 4.52 10*6/MM3 (ref 3.77–5.28)
SODIUM SERPL-SCNC: 136 MMOL/L (ref 136–145)
WBC # BLD AUTO: 7.28 10*3/MM3 (ref 3.4–10.8)

## 2020-11-22 PROCEDURE — 99024 POSTOP FOLLOW-UP VISIT: CPT | Performed by: SURGERY

## 2020-11-22 PROCEDURE — 80053 COMPREHEN METABOLIC PANEL: CPT | Performed by: SURGERY

## 2020-11-22 PROCEDURE — 25010000002 CEFTRIAXONE PER 250 MG: Performed by: SURGERY

## 2020-11-22 PROCEDURE — 94799 UNLISTED PULMONARY SVC/PX: CPT

## 2020-11-22 PROCEDURE — 85025 COMPLETE CBC W/AUTO DIFF WBC: CPT | Performed by: SURGERY

## 2020-11-22 PROCEDURE — G0378 HOSPITAL OBSERVATION PER HR: HCPCS

## 2020-11-22 RX ORDER — HYDROCODONE BITARTRATE AND ACETAMINOPHEN 5; 325 MG/1; MG/1
TABLET ORAL
Qty: 24 TABLET | Refills: 0 | Status: SHIPPED | OUTPATIENT
Start: 2020-11-22 | End: 2020-12-22

## 2020-11-22 RX ADMIN — CEFTRIAXONE SODIUM 2 G: 2 INJECTION, POWDER, FOR SOLUTION INTRAMUSCULAR; INTRAVENOUS at 07:43

## 2020-11-22 RX ADMIN — HYDROCODONE BITARTRATE AND ACETAMINOPHEN 1 TABLET: 5; 325 TABLET ORAL at 02:58

## 2020-11-22 RX ADMIN — SODIUM CHLORIDE 75 ML/HR: 9 INJECTION, SOLUTION INTRAVENOUS at 02:59

## 2020-11-22 RX ADMIN — METRONIDAZOLE 500 MG: 500 INJECTION, SOLUTION INTRAVENOUS at 02:58

## 2020-11-22 NOTE — PROGRESS NOTES
[facility] Medicine Services  INPATIENT PROGRESS NOTE    Length of Stay: 0  Date of Admission: 11/19/2020  Primary Care Physician: Liv Snyder    Subjective   Chief Complaint: Abdominal pain    HPI: Patient had cholecystectomy this morning.  She states that her abdominal pain is resolved.  She has no nausea. She feels hungry and wants to eat.    Review of Systems   Constitutional: Negative for appetite change, chills, fatigue and fever.   HENT: Negative for congestion and sore throat.    Eyes: Negative for pain and redness.   Respiratory: Negative for cough, chest tightness, shortness of breath and wheezing.    Cardiovascular: Negative for chest pain, palpitations and leg swelling.   Gastrointestinal: Negative for abdominal pain, constipation, diarrhea, nausea and vomiting.   Genitourinary: Negative for dysuria and hematuria.   Musculoskeletal: Negative for arthralgias, joint swelling and neck pain.   Skin: Negative for color change and rash.   Neurological: Negative for dizziness, syncope, light-headedness and headaches.   Hematological: Negative for adenopathy.   Psychiatric/Behavioral: Negative for agitation and confusion. The patient is not nervous/anxious.      Objective    Temp:  [96.5 °F (35.8 °C)-98 °F (36.7 °C)] 96.5 °F (35.8 °C)  Heart Rate:  [55-97] 65  Resp:  [18-20] 18  BP: (112-148)/(68-92) 126/74    Physical Exam  Constitutional:       General: She is not in acute distress.     Appearance: Normal appearance. She is not ill-appearing or diaphoretic.   HENT:      Head: Normocephalic and atraumatic.      Right Ear: External ear normal.      Left Ear: External ear normal.      Nose: No congestion or rhinorrhea.      Mouth/Throat:      Mouth: Mucous membranes are moist.      Pharynx: No oropharyngeal exudate or posterior oropharyngeal erythema.   Eyes:      General: No scleral icterus.     Extraocular Movements: Extraocular movements intact.       Conjunctiva/sclera: Conjunctivae normal.   Neck:      Musculoskeletal: Neck supple. No neck rigidity or muscular tenderness.   Cardiovascular:      Rate and Rhythm: Normal rate and regular rhythm.      Heart sounds: Normal heart sounds. No murmur.   Pulmonary:      Effort: Pulmonary effort is normal. No respiratory distress.      Breath sounds: Normal breath sounds. No wheezing, rhonchi or rales.   Abdominal:      General: Abdomen is flat. There is no distension.      Palpations: Abdomen is soft.      Tenderness: There is no abdominal tenderness. There is no guarding.      Comments: Laparoscopy incision scars noted.  No more tenderness in the right upper quadrant.   Musculoskeletal:         General: No swelling, tenderness or deformity.      Right lower leg: No edema.      Left lower leg: No edema.   Lymphadenopathy:      Cervical: No cervical adenopathy.   Skin:     General: Skin is warm and dry.   Neurological:      General: No focal deficit present.      Mental Status: She is alert and oriented to person, place, and time.      Cranial Nerves: No cranial nerve deficit.      Motor: No weakness.   Psychiatric:         Mood and Affect: Mood normal.         Behavior: Behavior normal.         Thought Content: Thought content normal.       Medication Review:    Current Facility-Administered Medications:   •  acetaminophen (TYLENOL) tablet 650 mg, 650 mg, Oral, Q4H PRN, Evan Aguirre MD  •  cefTRIAXone (ROCEPHIN) 2 g/100 mL 0.9% NS IVPB (MBP), 2 g, Intravenous, Q24H, Evan Aguirre MD, Stopped at 11/21/20 1043  •  [START ON 11/22/2020] enoxaparin (LOVENOX) syringe 40 mg, 40 mg, Subcutaneous, Daily, Evan Aguirre MD  •  HYDROcodone-acetaminophen (NORCO) 5-325 MG per tablet 1 tablet, 1 tablet, Oral, Q4H PRN, Evan Aguirre MD  •  HYDROmorphone (DILAUDID) injection 0.5 mg, 0.5 mg, Intravenous, Q2H PRN **AND** naloxone (NARCAN) injection 0.1 mg, 0.1 mg, Intravenous, Q5 Min PRN,  Evan Aguirre MD  •  metroNIDAZOLE (FLAGYL) 500 mg/100mL IVPB, 500 mg, Intravenous, Q8H, Evan Aguirre MD, Stopped at 11/21/20 1228  •  ondansetron (ZOFRAN) tablet 4 mg, 4 mg, Oral, Q6H PRN **OR** ondansetron (ZOFRAN) injection 4 mg, 4 mg, Intravenous, Q6H PRN, Evan Aguirre MD, 4 mg at 11/21/20 1757  •  [COMPLETED] Insert peripheral IV, , , Once **AND** sodium chloride 0.9 % flush 10 mL, 10 mL, Intravenous, PRN, Evan Aguirre MD  •  sodium chloride 0.9 % flush 10 mL, 10 mL, Intravenous, Q12H, Evan Aguirre MD, 10 mL at 11/19/20 2200  •  sodium chloride 0.9 % flush 10 mL, 10 mL, Intravenous, PRN, Evan Aguirre MD  •  sodium chloride 0.9 % infusion, 75 mL/hr, Intravenous, Continuous, Evan Aguirre MD, Last Rate: 75 mL/hr at 11/21/20 1744, 75 mL/hr at 11/21/20 1744    I have reviewed the patient's current medications.     Results Review:  I have reviewed the labs, radiology results, and diagnostic studies.    Laboratory Data:   Results from last 7 days   Lab Units 11/21/20  0634 11/20/20  0611 11/19/20  1507   SODIUM mmol/L 136 139 139   POTASSIUM mmol/L 3.7 3.7 3.9   CHLORIDE mmol/L 102 103 100   CO2 mmol/L 23.0 22.0 27.0   BUN mg/dL 12 10 12   CREATININE mg/dL 0.71 0.68 0.83   GLUCOSE mg/dL 72 97 119*   CALCIUM mg/dL 9.5 9.5 10.1   BILIRUBIN mg/dL 0.6 1.0 3.6*   ALK PHOS U/L 131* 157* 169*   ALT (SGPT) U/L 381* 629* 925*   AST (SGOT) U/L 106* 378* 903*   ANION GAP mmol/L 11.0 14.0 12.0     Estimated Creatinine Clearance: 126.4 mL/min (by C-G formula based on SCr of 0.71 mg/dL).          Results from last 7 days   Lab Units 11/21/20  0634 11/20/20  0611 11/19/20  1507   WBC 10*3/mm3 5.12 4.79 4.62   HEMOGLOBIN g/dL 12.2 12.3 14.1   HEMATOCRIT % 38.1 38.2 42.3   PLATELETS 10*3/mm3 199 210 242           Culture Data:   No results found for: BLOODCX  No results found for: URINECX  No results found for: RESPCX  No results found for: WOUNDCX  No  results found for: STOOLCX  No components found for: BODYFLD    Radiology Data:   Imaging Results (Last 24 Hours)     ** No results found for the last 24 hours. **          Assessment/Plan     Hospital Problem List:  Principal Problem:    Acute cholecystitis  Active Problems:    Gallstones    Biliary acute pancreatitis without necrosis or infection    Plan  -Patient has acute cholecystitis and laboratory findings consistent with gallstone pancreatitis  -Transaminases are trending down and her total bilirubin has normalized.  -She had cholecystectomy this morning with resolution of abdominal pain  -General surgery input appreciated  -Clear liquid diet and advance as tolerated  -Gastroenterology input appreciated.  -Pain control with IV morphine as needed  -Continue antibiotic coverage with IV ceftriaxone and Flagyl and discontinue in the next 24 hours  -Monitor liver function test and CBC for leukocytosis  -If patient remains stable and tolerates diet plan to discharge in next 24 hours  -DVT prophylaxis with SCDs  -CODE STATUS is full code      I discussed the patient's findings and my recommendations with patient.      Discharge Planning: In progress    Heaven Shin MD   11/21/20   18:15 CST

## 2020-11-22 NOTE — DISCHARGE SUMMARY
[facility] Medicine Services  DISCHARGE SUMMARY       Date of Admission: 11/19/2020  Date of Discharge:  11/22/2020  Primary Care Physician: Liv Snyder    Presenting Problem/History of Present Illness:  Gallstones [K80.20]  Elevated liver enzymes [R74.8]  Urinary tract infection in female [N39.0]       Final Discharge Diagnoses:  Active Hospital Problems    Diagnosis   • **Acute cholecystitis   • Gallstones   • Biliary acute pancreatitis without necrosis or infection       Consults:   Consults     Date and Time Order Name Status Description    11/20/2020 0953 Inpatient Gastroenterology Consult Completed     11/19/2020 1742 Inpatient General Surgery Consult Completed           Procedures Performed: Procedure(s):  CHOLECYSTECTOMY LAPAROSCOPIC                Pertinent Test Results:   Collected Updated Procedure Result Status    11/22/2020 0508 11/22/2020 0622 Comprehensive Metabolic Panel [[id number]]    (Abnormal)   Blood    Final result Component Value Units   Glucose 85 mg/dL   BUN 11 mg/dL   Creatinine 0.66 mg/dL   Sodium 136 mmol/L   Potassium 3.9 mmol/L   Chloride 101 mmol/L   CO2 22.0 mmol/L   Calcium 9.8 mg/dL   Total Protein 7.3 g/dL   Albumin 4.40 g/dL   ALT (SGPT) 283High  U/L   AST (SGOT) 57High  U/L   Alkaline Phosphatase 118High  U/L   Total Bilirubin 0.5 mg/dL   eGFR Non African Am 112 mL/min/1.73   Globulin 2.9 gm/dL   A/G Ratio 1.5 g/dL   BUN/Creatinine Ratio 16.7    Anion Gap 13.0 mmol/L        11/19/2020 1507 11/19/2020 1555 Comprehensive Metabolic Panel [[id number]]    (Abnormal)   Blood    Final result Component Value Units   Glucose 119High  mg/dL   BUN 12 mg/dL   Creatinine 0.83 mg/dL   Sodium 139 mmol/L   Potassium 3.9 mmol/L   Chloride 100 mmol/L   CO2 27.0 mmol/L   Calcium 10.1 mg/dL   Total Protein 7.9 g/dL   Albumin 5.10 g/dL   ALT (SGPT) 925High  U/L   AST (SGOT) 903High  U/L   Alkaline Phosphatase 169High  U/L   Total Bilirubin 3.6High  mg/dL    eGFR Non African Am 86 mL/min/1.73   Globulin 2.8 gm/dL   A/G Ratio 1.8 g/dL   BUN/Creatinine Ratio 14.5    Anion Gap 12.0 mmol/L           11/19/2020 1507 11/19/2020 1555 Lipase [[id number]]   (Abnormal)   Blood    Final result Component Value Units   Lipase 2,833High  U/L           11/19/2020 1507 11/19/2020 1512 CBC Auto Differential [[id number]]   (Abnormal)   Blood    Final result Component Value Units   WBC 4.62 10*3/mm3   RBC 5.05 10*6/mm3   Hemoglobin 14.1 g/dL   Hematocrit 42.3 %   MCV 83.8 fL   MCH 27.9 pg   MCHC 33.3 g/dL   RDW 13.1 %   RDW-SD 39.3 fl   MPV 10.3 fL   Platelets 242 10*3/mm3   Neutrophil Rel % 76.7High  %   Lymphocyte Rel % 16.2Low  %   Monocyte Rel % 5.2 %   Eosinophil Rel % 1.3 %   Basophil Rel % 0.4 %   Immature Granulocyte Rel % 0.2 %   Neutrophils Absolute 3.54 10*3/mm3   Lymphocytes Absolute 0.75 10*3/mm3   Monocytes Absolute 0.24 10*3/mm3   Eosinophils Absolute 0.06 10*3/mm3   Basophils Absolute 0.02 10*3/mm3   Immature Grans, Absolute 0.01 10*3/mm3   nRBC 0.0 /100 WBC           11/19/2020 1447 11/19/2020 1505 Urinalysis With Microscopic If Indicated (No Culture) - Urine, Clean Catch [[id number]]   (Abnormal)   Urine, Clean Catch    Final result Component Value   Color, UA OrangeAbnormal    Appearance, UA CloudyAbnormal    pH, UA 7.5   Specific Gravity, UA 1.026   Glucose Negative   Ketones, UA TraceAbnormal    Bilirubin, UA Large (3+)Abnormal    Blood, UA Negative   Protein, UA TraceAbnormal    Leukocytes, UA Small (1+)Abnormal    Nitrite, UA PositiveAbnormal    Urobilinogen, UA 1.0 E.U./dL          Chief Complaint on Day of Discharge: None    Hospital Course:  The patient is a 20 y.o. female with a past medical history of suspected irritable bowel syndrome currently being worked up by gastroenterologist Dr. Santoro who presented with complaints of intermittent right upper quadrant pain of 2 months duration that acutely worsened 24 hours prior to presentation.     Patient has been  "having right upper quadrant pain that has been worsening over the past 2 months.  The pain was intermittent in nature and episodes typically last for a few hours before resolving.  Patient developed similar episodes of right upper quadrant pain a day prior to presentation that radiated to her right scapula and upper shoulder. The pain persisted and she had multiple episodes of vomiting and nausea.  She also noted dark orange urine but denies dysuria or hematuria.  She denied fevers or chills.  On account of her symptoms she presented to the emergency room at [facility].  CMP showed elevated total bilirubin and transaminases.  She also had elevated amylase and lipase.  Right upper quadrant ultrasound showed gallbladder thickening and gallstones. CBD was normal in diameter.  She was admitted and managed for acute cholecystitis, gallstone pancreatitis and cholelithiasis.  She was placed n.p.o. and received IV fluids with Ringer's lactate.  She was also covered with IV antibiotics with ceftriaxone and Flagyl.  Her transaminases and lipase trended down and it was thought that her symptoms are from passage of a gallstone.  MRCP did not show any dilatation of the CBD or CBD stone.  She was reviewed by gastroenterologist Dr. Corbett.  She had laparoscopic cholecystectomy by general surgeon Dr. Aguirre.  Postoperatively her symptoms resolved and she was discharged in stable condition with instructions to follow-up with general surgeon Dr. Aguirre within 1 week of discharge.     Condition on Discharge: Stable    Physical Exam on Discharge:  /60 (BP Location: Right arm, Patient Position: Sitting)   Pulse 75   Temp 98.1 °F (36.7 °C)   Resp 18   Ht 162.6 cm (64\")   Wt 78.9 kg (174 lb)   LMP 11/10/2020   SpO2 97%   BMI 29.87 kg/m²      Physical Exam  Constitutional:       General: She is not in acute distress.     Appearance: Normal appearance. She is not ill-appearing or diaphoretic.   HENT:     "  Head: Normocephalic and atraumatic.      Right Ear: External ear normal.      Left Ear: External ear normal.      Nose: No congestion or rhinorrhea.      Mouth/Throat:      Mouth: Mucous membranes are moist.      Pharynx: No oropharyngeal exudate or posterior oropharyngeal erythema.   Eyes:      General: No scleral icterus.     Extraocular Movements: Extraocular movements intact.      Conjunctiva/sclera: Conjunctivae normal.   Neck:      Musculoskeletal: Neck supple. No neck rigidity or muscular tenderness.   Cardiovascular:      Rate and Rhythm: Normal rate and regular rhythm.      Heart sounds: Normal heart sounds. No murmur.   Pulmonary:      Effort: Pulmonary effort is normal. No respiratory distress.      Breath sounds: Normal breath sounds. No wheezing, rhonchi or rales.   Abdominal:      General: Abdomen is flat. There is no distension.      Palpations: Abdomen is soft.      Tenderness: There is no abdominal tenderness. There is no guarding.      Comments: Laparoscopy incision scars noted.  No more tenderness in the right upper quadrant.   Musculoskeletal:         General: No swelling, tenderness or deformity.      Right lower leg: No edema.      Left lower leg: No edema.   Lymphadenopathy:      Cervical: No cervical adenopathy.   Skin:     General: Skin is warm and dry.   Neurological:      General: No focal deficit present.      Mental Status: She is alert and oriented to person, place, and time.      Cranial Nerves: No cranial nerve deficit.      Motor: No weakness.   Psychiatric:         Mood and Affect: Mood normal.         Behavior: Behavior normal.         Thought Content: Thought content normal.     Discharge Disposition:  Home or Self Care    Discharge Medications:     Discharge Medications      New Medications      Instructions Start Date   HYDROcodone-acetaminophen 5-325 MG per tablet  Commonly known as: NORCO   Take one to two tablets every 4-6 hours prn pain         Continue These Medications       Instructions Start Date   Dexilant 60 MG capsule  Generic drug: dexlansoprazole   60 mg, Oral, Daily      IRON PO   Oral      LARISSIA PO   Oral      VITAMIN C PO   Oral             Discharge Diet:   Diet Instructions     Diet: Regular      Discharge Diet: Regular          Activity at Discharge:   Activity Instructions     Activity as Tolerated            Discharge Care Plan/Instructions:   1.  Follow-up with your primary care provider within 1 week of discharge.   2.  Follow-up with general surgeon Dr. Aguirre within 1 week of discharge    Follow-up Appointments:   Future Appointments   Date Time Provider Department Center   11/24/2020 11:00 AM Devon Santoro MD [facility] None       Test Results Pending at Discharge:   Pending Labs     Order Current Status    Tissue Pathology Exam Collected (11/21/20 3230)          Heaven Shin MD  11/22/20  18:46 CST    Time: 28 minutes of time was spent evaluating patient and planning discharge.      Part of this note may be an electronic transcription/translation of spoken language to printed text using the Dragon Dictation system.

## 2020-11-22 NOTE — PLAN OF CARE
Goal Outcome Evaluation:  Plan of Care Reviewed With: patient  Progress: no change  Patient pain is much improved since surgery, resting well, vitals stable

## 2020-11-24 ENCOUNTER — OFFICE VISIT (OUTPATIENT)
Dept: GASTROENTEROLOGY | Facility: CLINIC | Age: 20
End: 2020-11-24

## 2020-11-24 VITALS
HEIGHT: 64 IN | HEART RATE: 80 BPM | DIASTOLIC BLOOD PRESSURE: 91 MMHG | SYSTOLIC BLOOD PRESSURE: 131 MMHG | BODY MASS INDEX: 28.24 KG/M2 | WEIGHT: 165.4 LBS

## 2020-11-24 DIAGNOSIS — R10.13 EPIGASTRIC PAIN: Primary | ICD-10-CM

## 2020-11-24 PROCEDURE — 99214 OFFICE O/P EST MOD 30 MIN: CPT | Performed by: INTERNAL MEDICINE

## 2020-11-24 RX ORDER — OMEPRAZOLE 20 MG/1
20 CAPSULE, DELAYED RELEASE ORAL DAILY
Qty: 30 CAPSULE | Refills: 5 | Status: SHIPPED | OUTPATIENT
Start: 2020-11-24 | End: 2021-07-15 | Stop reason: SDUPTHER

## 2020-11-24 NOTE — PATIENT INSTRUCTIONS
"BMI for Adults  What is BMI?  Body mass index (BMI) is a number that is calculated from a person's weight and height. BMI can help estimate how much of a person's weight is composed of fat. BMI does not measure body fat directly. Rather, it is an alternative to procedures that directly measure body fat, which can be difficult and expensive.  BMI can help identify people who may be at higher risk for certain medical problems.  What are BMI measurements used for?  BMI is used as a screening tool to identify possible weight problems. It helps determine whether a person is obese, overweight, a healthy weight, or underweight.  BMI is useful for:  · Identifying a weight problem that may be related to a medical condition or may increase the risk for medical problems.  · Promoting changes, such as changes in diet and exercise, to help reach a healthy weight. BMI screening can be repeated to see if these changes are working.  How is BMI calculated?  BMI involves measuring your weight in relation to your height. Both height and weight are measured, and the BMI is calculated from those numbers. This can be done either in English ([place] or metric measurements. Note that charts and online BMI calculators are available to help you find your BMI quickly and easily without having to do these calculations yourself.  To calculate your BMI in English ([place] measurements:    1. Measure your weight in pounds (lb).  2. Multiply the number of pounds by 703.  ? For example, for a person who weighs 180 lb, multiply that number by 703, which equals 126,540.  3. Measure your height in inches. Then multiply that number by itself to get a measurement called \"inches squared.\"  ? For example, for a person who is 70 inches tall, the \"inches squared\" measurement is 70 inches x 70 inches, which equals 4,900 inches squared.  4. Divide the total from step 2 (number of lb x 703) by the total from step 3 (inches squared): 126,540 ÷ 4,900 = 25.8. This is " "your BMI.  To calculate your BMI in metric measurements:  1. Measure your weight in kilograms (kg).  2. Measure your height in meters (m). Then multiply that number by itself to get a measurement called \"meters squared.\"  ? For example, for a person who is 1.75 m tall, the \"meters squared\" measurement is 1.75 m x 1.75 m, which is equal to 3.1 meters squared.  3. Divide the number of kilograms (your weight) by the meters squared number. In this example: 70 ÷ 3.1 = 22.6. This is your BMI.  What do the results mean?  BMI charts are used to identify whether you are underweight, normal weight, overweight, or obese. The following guidelines will be used:  · Underweight: BMI less than 18.5.  · Normal weight: BMI between 18.5 and 24.9.  · Overweight: BMI between 25 and 29.9.  · Obese: BMI of 30 or above.  Keep these notes in mind:  · Weight includes both fat and muscle, so someone with a muscular build, such as an athlete, may have a BMI that is higher than 24.9. In cases like these, BMI is not an accurate measure of body fat.  · To determine if excess body fat is the cause of a BMI of 25 or higher, further assessments may need to be done by a health care provider.  · BMI is usually interpreted in the same way for men and women.  Where to find more information  For more information about BMI, including tools to quickly calculate your BMI, go to these websites:  · Centers for Disease Control and Prevention: www.cdc.gov  · American Heart Association: www.heart.org  · National Heart, Lung, and Blood [place]: www.nhlbi.nih.gov  Summary  · Body mass index (BMI) is a number that is calculated from a person's weight and height.  · BMI may help estimate how much of a person's weight is composed of fat. BMI can help identify those who may be at higher risk for certain medical problems.  · BMI can be measured using English measurements or metric measurements.  · BMI charts are used to identify whether you are underweight, normal " weight, overweight, or obese.  This information is not intended to replace advice given to you by your health care provider. Make sure you discuss any questions you have with your health care provider.  Document Revised: 09/09/2020 Document Reviewed: 07/17/2020  Elsevier Patient Education © 2020 [employer]

## 2020-11-24 NOTE — PROGRESS NOTES
[facility]      Chief Complaint:   Chief Complaint   Patient presents with   • Abdominal Pain     resolved had gallbladder removed 11-21/20       Subjective     HPI:   Patient with abdominal pain patient was recently in the hospital where she was found to have cholecystitis.  Patient had cholecystectomy and states that her abdomen feels better patient continues to get some epigastric abdominal pain.    Plan; we will have patient follow-up in 1 month we will start patient on proton pump inhibitor.  If patient's pain continues we will continue with work-up for causes of abdominal pain    Past Medical History:   History reviewed. No pertinent past medical history.    Past Surgical History:  Past Surgical History:   Procedure Laterality Date   • COLONOSCOPY N/A 10/22/2020    Procedure: COLONOSCOPY;  Surgeon: Devon Santoro MD;  Location: [facility] ENDOSCOPY;  Service: Gastroenterology;  Laterality: N/A;   • ENDOSCOPY N/A 10/22/2020    Procedure: ESOPHAGOGASTRODUODENOSCOPY;  Surgeon: Devon Santoro MD;  Location: [facility] ENDOSCOPY;  Service: Gastroenterology;  Laterality: N/A;   • WISDOM TOOTH EXTRACTION  2018       Family History:  Family History   Problem Relation Age of Onset   • Irritable bowel syndrome Mother    • Bipolar disorder Mother    • Irritable bowel syndrome Father        Social History:   reports that she has never smoked. She does not have any smokeless tobacco history on file. She reports that she does not drink alcohol.    Medications:   Prior to Admission medications    Medication Sig Start Date End Date Taking? Authorizing Provider   Ascorbic Acid (VITAMIN C PO) Take  by mouth.   Yes Jeri Bailey MD   dexlansoprazole (Dexilant) 60 MG capsule Take 1 capsule by mouth Daily. 10/30/20  Yes Devon Santoro MD   Ferrous Sulfate (IRON PO) Take  by mouth.   Yes Jeri Bailey MD   HYDROcodone-acetaminophen (NORCO) 5-325 MG per tablet Take one to two tablets every 4-6  "hours prn pain 11/22/20  Yes Evan Aguirre MD   Levonorgestrel-Ethinyl Estrad (LARISSIA PO) Take  by mouth.   Yes Provider, MD Jeri   omeprazole (priLOSEC) 20 MG capsule Take 1 capsule by mouth Daily. 11/24/20   Devon Santoro MD       Allergies:  Patient has no known allergies.    ROS:    Review of Systems   Constitutional: Negative for activity change, appetite change, chills, diaphoresis, fatigue, fever and unexpected weight change.   HENT: Negative for sore throat and trouble swallowing.    Respiratory: Negative for shortness of breath.    Gastrointestinal: Negative for abdominal distention, abdominal pain, anal bleeding, blood in stool, constipation, diarrhea, nausea, rectal pain and vomiting.   Endocrine: Negative for polydipsia, polyphagia and polyuria.   Genitourinary: Negative for difficulty urinating.   Musculoskeletal: Negative for arthralgias.   Skin: Negative for pallor.   Allergic/Immunologic: Negative for food allergies.   Neurological: Negative for weakness and light-headedness.   Psychiatric/Behavioral: Negative for behavioral problems.     Objective     Blood pressure 131/91, pulse 80, height 162.6 cm (64\"), weight 75 kg (165 lb 6.4 oz), last menstrual period 11/10/2020, not currently breastfeeding.    Physical Exam  Constitutional:       General: She is not in acute distress.     Appearance: She is well-developed. She is not diaphoretic.   HENT:      Head: Normocephalic and atraumatic.   Cardiovascular:      Rate and Rhythm: Normal rate and regular rhythm.      Heart sounds: Normal heart sounds. No murmur. No friction rub. No gallop.    Pulmonary:      Effort: No respiratory distress.      Breath sounds: Normal breath sounds. No wheezing or rales.   Chest:      Chest wall: No tenderness.   Abdominal:      General: Bowel sounds are normal. There is no distension.      Palpations: Abdomen is soft. There is no mass.      Tenderness: There is no abdominal tenderness. There is no " guarding or rebound.      Hernia: No hernia is present.   Musculoskeletal: Normal range of motion.   Skin:     General: Skin is warm and dry.      Coloration: Skin is not pale.      Findings: No erythema or rash.   Neurological:      Mental Status: She is alert and oriented to person, place, and time.   Psychiatric:         Behavior: Behavior normal.         Thought Content: Thought content normal.         Judgment: Judgment normal.          Assessment/Plan   Diagnoses and all orders for this visit:    1. Epigastric pain (Primary)    Other orders  -     omeprazole (priLOSEC) 20 MG capsule; Take 1 capsule by mouth Daily.  Dispense: 30 capsule; Refill: 5        * Surgery not found *     Diagnosis Plan   1. Epigastric pain         Anticipated Surgical Procedure:  No orders of the defined types were placed in this encounter.      The risks, benefits, and alternatives of this procedure have been discussed with the patient or the responsible party- the patient understands and agrees to proceed.

## 2020-11-28 LAB
LAB AP CASE REPORT: NORMAL
PATH REPORT.FINAL DX SPEC: NORMAL

## 2020-11-30 ENCOUNTER — OFFICE VISIT (OUTPATIENT)
Dept: SURGERY | Facility: CLINIC | Age: 20
End: 2020-11-30

## 2020-11-30 VITALS
TEMPERATURE: 97 F | BODY MASS INDEX: 28.17 KG/M2 | HEIGHT: 64 IN | HEART RATE: 93 BPM | SYSTOLIC BLOOD PRESSURE: 122 MMHG | WEIGHT: 165 LBS | DIASTOLIC BLOOD PRESSURE: 84 MMHG

## 2020-11-30 DIAGNOSIS — Z09 FOLLOW UP: Primary | ICD-10-CM

## 2020-11-30 PROCEDURE — 99024 POSTOP FOLLOW-UP VISIT: CPT | Performed by: SURGERY

## 2020-11-30 NOTE — PROGRESS NOTES
CHIEF COMPLAINT:    Chief Complaint   Patient presents with   • Post-op     [address] [place] [address]-       HISTORY OF PRESENT ILLNESS:    Oneida Patel is a 20 y.o. female who underwent laparoscopic cholecystectomy on 11/21/2020 for gallstone pancreatitis.  Pathology showed chronic and subacute cholecystitis with cholelithiasis.  This was discussed with her today.  Her complaint today is primarily of lower back musculoskeletal type pain.  She also states that she is having multiple loose bowel movements throughout the day.  She reports no nausea or vomiting, no epigastric or right upper quadrant pain.    EXAM:  Vitals:    11/30/20 1304   BP: 122/84   Pulse: 93   Temp: 97 °F (36.1 °C)         Abdomen soft, incisions healing appropriately    ASSESSMENT:    Status post laparoscopic cholecystectomy    PLAN:    Overall she appears to be healing well.  I suspect that she has strained muscles in her lower back.  She does have some chronic GI issues that could be contributing to her current diarrhea in addition to her recent cholecystectomy.  We will see her back in 2 weeks to see if this has improved.  We also discussed decreasing fat in her diet to help prevent loose stools.          This document has been electronically signed by Evan Aguirre MD on November 30, 2020 13:28 CST

## 2020-12-14 ENCOUNTER — OFFICE VISIT (OUTPATIENT)
Dept: SURGERY | Facility: CLINIC | Age: 20
End: 2020-12-14

## 2020-12-14 VITALS
SYSTOLIC BLOOD PRESSURE: 118 MMHG | BODY MASS INDEX: 28.13 KG/M2 | TEMPERATURE: 97.2 F | WEIGHT: 164.8 LBS | DIASTOLIC BLOOD PRESSURE: 82 MMHG | HEART RATE: 74 BPM | HEIGHT: 64 IN

## 2020-12-14 DIAGNOSIS — Z09 FOLLOW UP: Primary | ICD-10-CM

## 2020-12-14 PROCEDURE — 99024 POSTOP FOLLOW-UP VISIT: CPT | Performed by: SURGERY

## 2020-12-14 RX ORDER — FLUOXETINE 10 MG/1
CAPSULE ORAL DAILY
COMMUNITY
Start: 2020-12-02

## 2020-12-14 NOTE — PROGRESS NOTES
CHIEF COMPLAINT:    Chief Complaint   Patient presents with   • Follow-up     2 Wks. marques- S/P Rodrigo [place] 11-       HISTORY OF PRESENT ILLNESS:    Oneida Patel is a 20 y.o. female who underwent prior laparoscopic cholecystectomy for gallstone pancreatitis.  The patient is here today for continued follow-up.  She reports no nausea or vomiting at home, no abdominal pain.  She continues to have diarrhea multiple times per day.  She is seeing her gastroenterologist about this next week.  Otherwise she is doing well.    EXAM:  Vitals:    12/14/20 1428   BP: 118/82   Pulse: 74   Temp: 97.2 °F (36.2 °C)         Abdomen soft, incisions well-healed    ASSESSMENT:    Status post laparoscopic cholecystectomy    PLAN:    Overall doing well aside from her diarrhea.  Given her prior history of irritable bowel syndrome type problems I will defer to her gastroenterologist for further management of this.  She can see me as needed.          This document has been electronically signed by Evan Aguirre MD on December 14, 2020 14:43 CST

## 2020-12-22 ENCOUNTER — OFFICE VISIT (OUTPATIENT)
Dept: GASTROENTEROLOGY | Facility: CLINIC | Age: 20
End: 2020-12-22

## 2020-12-22 VITALS
OXYGEN SATURATION: 98 % | SYSTOLIC BLOOD PRESSURE: 106 MMHG | HEART RATE: 64 BPM | WEIGHT: 163.4 LBS | HEIGHT: 64 IN | BODY MASS INDEX: 27.9 KG/M2 | DIASTOLIC BLOOD PRESSURE: 70 MMHG

## 2020-12-22 DIAGNOSIS — R10.13 EPIGASTRIC PAIN: Primary | ICD-10-CM

## 2020-12-22 DIAGNOSIS — R19.7 DIARRHEA, UNSPECIFIED TYPE: ICD-10-CM

## 2020-12-22 PROCEDURE — 99214 OFFICE O/P EST MOD 30 MIN: CPT | Performed by: INTERNAL MEDICINE

## 2020-12-22 RX ORDER — DICYCLOMINE HCL 20 MG
20 TABLET ORAL
Qty: 90 TABLET | Refills: 5 | Status: SHIPPED | OUTPATIENT
Start: 2020-12-22 | End: 2021-01-21

## 2020-12-22 NOTE — PATIENT INSTRUCTIONS
MyPlate from USDA    MyPlate is an outline of a general healthy diet based on the 2010 Dietary Guidelines for Americans, from the U.S. Department of Agriculture (USDA). It sets guidelines for how much food you should eat from each food group based on your age, sex, and level of physical activity.  What are tips for following MyPlate?  To follow MyPlate recommendations:  · Eat a wide variety of fruits and vegetables, grains, and protein foods.  · Serve smaller portions and eat less food throughout the day.  · Limit portion sizes to avoid overeating.  · Enjoy your food.  · Get at least 150 minutes of exercise every week. This is about 30 minutes each day, 5 or more days per week.  It can be difficult to have every meal look like MyPlate. Think about MyPlate as eating guidelines for an entire day, rather than each individual meal.  Fruits and vegetables  · Make half of your plate fruits and vegetables.  · Eat many different colors of fruits and vegetables each day.  · For a 2,000 calorie daily food plan, eat:  ? 2½ cups of vegetables every day.  ? 2 cups of fruit every day.  · 1 cup is equal to:  ? 1 cup raw or cooked vegetables.  ? 1 cup raw fruit.  ? 1 medium-sized orange, apple, or banana.  ? 1 cup 100% fruit or vegetable juice.  ? 2 cups raw leafy greens, such as lettuce, spinach, or kale.  ? ½ cup dried fruit.  Grains  · One fourth of your plate should be grains.  · Make at least half of the grains you eat each day whole grains.  · For a 2,000 calorie daily food plan, eat 6 oz of grains every day.  · 1 oz is equal to:  ? 1 slice bread.  ? 1 cup cereal.  ? ½ cup cooked rice, cereal, or pasta.  Protein  · One fourth of your plate should be protein.  · Eat a wide variety of protein foods, including meat, poultry, fish, eggs, beans, nuts, and tofu.  · For a 2,000 calorie daily food plan, eat 5½ oz of protein every day.  · 1 oz is equal to:  ? 1 oz meat, poultry, or fish.  ? ¼ cup cooked beans.  ? 1 egg.  ? ½ oz nuts  or seeds.  ? 1 Tbsp peanut butter.  Dairy  · Drink fat-free or low-fat (1%) milk.  · Eat or drink dairy as a side to meals.  · For a 2,000 calorie daily food plan, eat or drink 3 cups of dairy every day.  · 1 cup is equal to:  ? 1 cup milk, yogurt, cottage cheese, or soy milk (soy beverage).  ? 2 oz processed cheese.  ? 1½ oz natural cheese.  Fats, oils, salt, and sugars  · Only small amounts of oils are recommended.  · Avoid foods that are high in calories and low in nutritional value (empty calories), like foods high in fat or added sugars.  · Choose foods that are low in salt (sodium). Choose foods that have less than 140 milligrams (mg) of sodium per serving.  · Drink water instead of sugary drinks. Drink enough water each day to keep your urine pale yellow.  Where to find support  · Work with your health care provider or a nutrition specialist (dietitian) to develop a customized eating plan that is right for you.  · Download an pablito (mobile application) to help you track your daily food intake.  Where to find more information  · Go to ChooseMyPlate.gov for more information.  Summary  · MyPlate is a general guideline for healthy eating from the USDA. It is based on the 2010 Dietary Guidelines for Americans.  · In general, fruits and vegetables should take up ½ of your plate, grains should take up ¼ of your plate, and protein should take up ¼ of your plate.  This information is not intended to replace advice given to you by your health care provider. Make sure you discuss any questions you have with your health care provider.  Document Revised: 05/21/2020 Document Reviewed: 03/19/2018  Elsevier Patient Education © 2020 Elsevier Inc.

## 2020-12-22 NOTE — PROGRESS NOTES
[facility]      Chief Complaint:   Chief Complaint   Patient presents with   • Abdominal Pain       Subjective     HPI:   She with continued abdominal pain and diarrhea.  Patient states she had severe cramping abdominal pain at times.  Patient recently underwent cholecystectomy.  Discussing patient's diet she is eating the correct foods not eating high fat foods that would cause cramping and diarrhea.  Patient is very early postop this may just be postop abdominal pain.  Will treat with Bentyl 20 mg 4 times daily for abdominal cramping.  Will have patient follow-up in 2 weeks with lab work.    Plan; patient follow-up in 2 weeks with lab work if pain continues at that time will consider possibly repeating MRCP to evaluate for any possible  retained stones in the common bile duct    Past Medical History:   History reviewed. No pertinent past medical history.    Past Surgical History:  Past Surgical History:   Procedure Laterality Date   • CHOLECYSTECTOMY WITH INTRAOPERATIVE CHOLANGIOGRAM N/A 11/21/2020    Procedure: CHOLECYSTECTOMY LAPAROSCOPIC;  Surgeon: Evan Aguirre MD;  Location: [facility] OR;  Service: General;  Laterality: N/A;   • COLONOSCOPY N/A 10/22/2020    Procedure: COLONOSCOPY;  Surgeon: Devon Santoro MD;  Location: [facility] ENDOSCOPY;  Service: Gastroenterology;  Laterality: N/A;   • ENDOSCOPY N/A 10/22/2020    Procedure: ESOPHAGOGASTRODUODENOSCOPY;  Surgeon: Devon Santoro MD;  Location: [facility] ENDOSCOPY;  Service: Gastroenterology;  Laterality: N/A;   • UPPER GASTROINTESTINAL ENDOSCOPY  10/22/2020   • WISDOM TOOTH EXTRACTION  2018       Family History:  Family History   Problem Relation Age of Onset   • Irritable bowel syndrome Mother    • Bipolar disorder Mother    • Irritable bowel syndrome Father        Social History:   reports that she has never smoked. She has never used smokeless tobacco. She reports that she does not drink alcohol or use  "drugs.    Medications:   Prior to Admission medications    Medication Sig Start Date End Date Taking? Authorizing Provider   omeprazole (priLOSEC) 20 MG capsule Take 1 capsule by mouth Daily. 11/24/20  Yes Devon Santoro MD   Ascorbic Acid (VITAMIN C PO) Take  by mouth.    ProviderJeri MD   dexlansoprazole (Dexilant) 60 MG capsule Take 1 capsule by mouth Daily. 10/30/20   Devon Santoro MD   dicyclomine (BENTYL) 20 MG tablet Take 1 tablet by mouth 4 (Four) Times a Day Before Meals & at Bedtime As Needed (cramping) for up to 30 days. 12/22/20 1/21/21  Devon Santoro MD   Ferrous Sulfate (IRON PO) Take  by mouth.    ProviderJeri MD   FLUoxetine (PROzac) 10 MG capsule Daily. 12/2/20   Jeri Bailey MD   Levonorgestrel-Ethinyl Estrad (LARISSIA PO) Take  by mouth.    ProvidereJri MD   HYDROcodone-acetaminophen (NORCO) 5-325 MG per tablet Take one to two tablets every 4-6 hours prn pain 11/22/20 12/22/20  Evan Aguirre MD       Allergies:  Patient has no known allergies.    ROS:    Review of Systems   Constitutional: Negative for activity change, appetite change, chills, diaphoresis, fatigue, fever and unexpected weight change.   HENT: Negative for sore throat and trouble swallowing.    Respiratory: Negative for shortness of breath.    Gastrointestinal: Positive for abdominal pain and diarrhea. Negative for abdominal distention, anal bleeding, blood in stool, constipation, nausea, rectal pain and vomiting.   Endocrine: Negative for polydipsia, polyphagia and polyuria.   Genitourinary: Negative for difficulty urinating.   Musculoskeletal: Negative for arthralgias.   Skin: Negative for pallor.   Allergic/Immunologic: Negative for food allergies.   Neurological: Negative for weakness and light-headedness.   Psychiatric/Behavioral: Negative for behavioral problems.     Objective     Blood pressure 106/70, pulse 64, height 162.6 cm (64\"), weight 74.1 kg (163 lb 6.4 oz), SpO2 " 98 %, not currently breastfeeding.    Physical Exam  Constitutional:       General: She is not in acute distress.     Appearance: She is well-developed. She is not diaphoretic.   HENT:      Head: Normocephalic and atraumatic.   Cardiovascular:      Rate and Rhythm: Normal rate and regular rhythm.      Heart sounds: Normal heart sounds. No murmur. No friction rub. No gallop.    Pulmonary:      Effort: No respiratory distress.      Breath sounds: Normal breath sounds. No wheezing or rales.   Chest:      Chest wall: No tenderness.   Abdominal:      General: Bowel sounds are normal. There is no distension.      Palpations: Abdomen is soft. There is no mass.      Tenderness: There is abdominal tenderness. There is no guarding or rebound.      Hernia: No hernia is present.   Musculoskeletal: Normal range of motion.   Skin:     General: Skin is warm and dry.      Coloration: Skin is not pale.      Findings: No erythema or rash.   Neurological:      Mental Status: She is alert and oriented to person, place, and time.   Psychiatric:         Behavior: Behavior normal.         Thought Content: Thought content normal.         Judgment: Judgment normal.          Assessment/Plan   Diagnoses and all orders for this visit:    1. Epigastric pain (Primary)  -     Comprehensive Metabolic Panel; Future  -     CBC & Differential; Future  -     Amylase; Future  -     Lipase; Future    2. Diarrhea, unspecified type  -     Comprehensive Metabolic Panel; Future  -     CBC & Differential; Future  -     Amylase; Future  -     Lipase; Future    Other orders  -     dicyclomine (BENTYL) 20 MG tablet; Take 1 tablet by mouth 4 (Four) Times a Day Before Meals & at Bedtime As Needed (cramping) for up to 30 days.  Dispense: 90 tablet; Refill: 5        * Surgery not found *     Diagnosis Plan   1. Epigastric pain  Comprehensive Metabolic Panel    CBC & Differential    Amylase    Lipase   2. Diarrhea, unspecified type  Comprehensive Metabolic Panel     CBC & Differential    Amylase    Lipase       Anticipated Surgical Procedure:  Orders Placed This Encounter   Procedures   • Comprehensive Metabolic Panel     Standing Status:   Future   • Amylase     Standing Status:   Future   • Lipase     Standing Status:   Future   • CBC & Differential     Standing Status:   Future     Order Specific Question:   Manual Differential     Answer:   No       The risks, benefits, and alternatives of this procedure have been discussed with the patient or the responsible party- the patient understands and agrees to proceed.

## 2021-07-15 NOTE — TELEPHONE ENCOUNTER
07/15/2021, 1440 - Facsimile received from [employer], [address] [place] [address], Telephone [phone], Facsimile [phone], requesting 90 supply of prescription medication Omeprazole 20 MG Capsules, 1 capsule by mouth daily.

## 2021-07-16 RX ORDER — OMEPRAZOLE 20 MG/1
20 CAPSULE, DELAYED RELEASE ORAL DAILY
Qty: 90 CAPSULE | Refills: 3 | Status: SHIPPED | OUTPATIENT
Start: 2021-07-16

## 2021-09-10 ENCOUNTER — TELEPHONE (OUTPATIENT)
Dept: GASTROENTEROLOGY | Facility: CLINIC | Age: 21
End: 2021-09-10

## 2021-09-10 NOTE — TELEPHONE ENCOUNTER
09/10/2021, 0927 - Patient telephoned per this staff member [phone].  Zero answer.  Voice message submitted with date, time, office contact information, and request to contact office at earliest convenience to schedule clinical appointment with Dr. Devon Lopez M.D. for continuation of prescription medication.    Note - [place] Telephone [phone], Facsimile [phone] for 90 day prescription medication request regarding Omeprazole 20 MG Capsules, 1 capsule by mouth daily.  Patient's prior clinical appointment on 12/22/2020 with Dr. Devon Lopez M.D.

## 2023-03-08 NOTE — PATIENT INSTRUCTIONS
"BMI for Adults  What is BMI?  Body mass index (BMI) is a number that is calculated from a person's weight and height. BMI can help estimate how much of a person's weight is composed of fat. BMI does not measure body fat directly. Rather, it is an alternative to procedures that directly measure body fat, which can be difficult and expensive.  BMI can help identify people who may be at higher risk for certain medical problems.  What are BMI measurements used for?  BMI is used as a screening tool to identify possible weight problems. It helps determine whether a person is obese, overweight, a healthy weight, or underweight.  BMI is useful for:  · Identifying a weight problem that may be related to a medical condition or may increase the risk for medical problems.  · Promoting changes, such as changes in diet and exercise, to help reach a healthy weight. BMI screening can be repeated to see if these changes are working.  How is BMI calculated?  BMI involves measuring your weight in relation to your height. Both height and weight are measured, and the BMI is calculated from those numbers. This can be done either in English (U[place] or metric measurements. Note that charts and online BMI calculators are available to help you find your BMI quickly and easily without having to do these calculations yourself.  To calculate your BMI in English (U.S[place] measurements:    1. Measure your weight in pounds (lb).  2. Multiply the number of pounds by 703.  ? For example, for a person who weighs 180 lb, multiply that number by 703, which equals 126,540.  3. Measure your height in inches. Then multiply that number by itself to get a measurement called \"inches squared.\"  ? For example, for a person who is 70 inches tall, the \"inches squared\" measurement is 70 inches x 70 inches, which equals 4,900 inches squared.  4. Divide the total from step 2 (number of lb x 703) by the total from step 3 (inches squared): 126,540 ÷ 4,900 = 25.8. This is " "your BMI.  To calculate your BMI in metric measurements:  1. Measure your weight in kilograms (kg).  2. Measure your height in meters (m). Then multiply that number by itself to get a measurement called \"meters squared.\"  ? For example, for a person who is 1.75 m tall, the \"meters squared\" measurement is 1.75 m x 1.75 m, which is equal to 3.1 meters squared.  3. Divide the number of kilograms (your weight) by the meters squared number. In this example: 70 ÷ 3.1 = 22.6. This is your BMI.  What do the results mean?  BMI charts are used to identify whether you are underweight, normal weight, overweight, or obese. The following guidelines will be used:  · Underweight: BMI less than 18.5.  · Normal weight: BMI between 18.5 and 24.9.  · Overweight: BMI between 25 and 29.9.  · Obese: BMI of 30 or above.  Keep these notes in mind:  · Weight includes both fat and muscle, so someone with a muscular build, such as an athlete, may have a BMI that is higher than 24.9. In cases like these, BMI is not an accurate measure of body fat.  · To determine if excess body fat is the cause of a BMI of 25 or higher, further assessments may need to be done by a health care provider.  · BMI is usually interpreted in the same way for men and women.  Where to find more information  For more information about BMI, including tools to quickly calculate your BMI, go to these websites:  · Centers for Disease Control and Prevention: www.cdc.gov  · American Heart Association: www.heart.org  · National Heart, Lung, and Blood [place]: www.nhlbi.nih.gov  Summary  · Body mass index (BMI) is a number that is calculated from a person's weight and height.  · BMI may help estimate how much of a person's weight is composed of fat. BMI can help identify those who may be at higher risk for certain medical problems.  · BMI can be measured using English measurements or metric measurements.  · BMI charts are used to identify whether you are underweight, normal " weight, overweight, or obese.  This information is not intended to replace advice given to you by your health care provider. Make sure you discuss any questions you have with your health care provider.  Document Released: 08/29/2005 Document Revised: 09/09/2020 Document Reviewed: 07/17/2020  Elsevier Patient Education © 2020 Elsevier Inc.     Which Mid-Level Are You Referring To?: B

## (undated) DEVICE — SOL IRRIG NACL 1000ML

## (undated) DEVICE — SYR LUERLOK 30CC

## (undated) DEVICE — GLV SURG SENSICARE POLYISPRN W/ALOE PF LF 6 GRN STRL

## (undated) DEVICE — SUT VIC 0/0 UR6 27IN DYED J603H

## (undated) DEVICE — PK LAP CHOLE LF 60

## (undated) DEVICE — ENDOPOUCH RETRIEVER SPECIMEN RETRIEVAL BAGS: Brand: ENDOPOUCH RETRIEVER

## (undated) DEVICE — ENDOPATH XCEL BLADELESS TROCARS WITH STABILITY SLEEVES: Brand: ENDOPATH XCEL

## (undated) DEVICE — CANN SMPL SOFTECH BIFLO ETCO2 A/M 7FT

## (undated) DEVICE — THE KUMAR CATHETER®, USED IN CONJUNCTION WITH KUMAR CHOLANGIOGRAPHY® CLAMP, IS MEANT TO PROVIDE A MEANS OF LAPAROSCOPIC CHOLANGIOGRAPHY. IT COMPRISES A TRANSLUCENT TUBING ( 76 CM. LENGTH AND 16 GA. ) THAT CARRIES A 19 GA., 1.25 CM LONG NEEDLE AT THE END. THE KUMAR CATHETER® IS USED TO PUNCTURE THE HARTMANN'S POUCH OF THE GALLBLADDER FOR BILIARY ACCESS AND / OR ASPIRATION. PRODUCT IS LATEX FREE.: Brand: KUMAR CATHETER®

## (undated) DEVICE — SINGLE-USE BIOPSY FORCEPS: Brand: RADIAL JAW 4

## (undated) DEVICE — BITEBLOCK ENDO W/STRAP 60F A/ LF DISP

## (undated) DEVICE — ANTIBACTERIAL UNDYED BRAIDED (POLYGLACTIN 910), SYNTHETIC ABSORBABLE SUTURE: Brand: COATED VICRYL

## (undated) DEVICE — MONOPOLAR METZENBAUM SCISSOR TIP, DISPOSABLE: Brand: MONOPOLAR METZENBAUM SCISSOR TIP, DISPOSABLE

## (undated) DEVICE — GLV SURG SIGNATURE ESSENTIAL PF LTX SZ7.5

## (undated) DEVICE — SKIN AFFIX SURG ADHESIVE 72/CS 0.55ML: Brand: MEDLINE

## (undated) DEVICE — GLV SURG SENSICARE POLYISPRN W/ALOE PF LF 6.5 GRN STRL

## (undated) DEVICE — APPL CHLORAPREP W/TINT 26ML ORNG

## (undated) DEVICE — SYS CLS PORTSITE CT CLOSESURE 5AND10/12

## (undated) DEVICE — 9165 UNIVERSAL PATIENT PLATE: Brand: 3M™

## (undated) DEVICE — GLV SURG SIGNATURE ESSENTIAL PF LTX SZ6.5

## (undated) DEVICE — STERILE POLYISOPRENE POWDER-FREE SURGICAL GLOVES WITH EMOLLIENT COATING: Brand: PROTEXIS

## (undated) DEVICE — LUER-LOK 360°: Brand: CONNECTA, LUER-LOK

## (undated) DEVICE — SYR LL TP 10ML STRL

## (undated) DEVICE — CORE TRUMPET FOR SINGLE SOLUTION BAG: Brand: CORE DYNAMICS